# Patient Record
Sex: FEMALE | Race: WHITE | NOT HISPANIC OR LATINO | URBAN - METROPOLITAN AREA
[De-identification: names, ages, dates, MRNs, and addresses within clinical notes are randomized per-mention and may not be internally consistent; named-entity substitution may affect disease eponyms.]

---

## 2018-08-14 ENCOUNTER — INPATIENT (INPATIENT)
Facility: HOSPITAL | Age: 43
LOS: 1 days | Discharge: ANOTHER HOSPITAL | DRG: 101 | End: 2018-08-16
Attending: HOSPITALIST | Admitting: HOSPITALIST
Payer: MEDICARE

## 2018-08-14 VITALS
RESPIRATION RATE: 16 BRPM | HEART RATE: 73 BPM | WEIGHT: 119.93 LBS | SYSTOLIC BLOOD PRESSURE: 104 MMHG | DIASTOLIC BLOOD PRESSURE: 66 MMHG | OXYGEN SATURATION: 100 % | TEMPERATURE: 98 F | HEIGHT: 61 IN

## 2018-08-14 DIAGNOSIS — E78.5 HYPERLIPIDEMIA, UNSPECIFIED: ICD-10-CM

## 2018-08-14 DIAGNOSIS — F19.20 OTHER PSYCHOACTIVE SUBSTANCE DEPENDENCE, UNCOMPLICATED: ICD-10-CM

## 2018-08-14 DIAGNOSIS — F10.10 ALCOHOL ABUSE, UNCOMPLICATED: ICD-10-CM

## 2018-08-14 DIAGNOSIS — F13.20 SEDATIVE, HYPNOTIC OR ANXIOLYTIC DEPENDENCE, UNCOMPLICATED: ICD-10-CM

## 2018-08-14 DIAGNOSIS — F41.9 ANXIETY DISORDER, UNSPECIFIED: ICD-10-CM

## 2018-08-14 DIAGNOSIS — Z29.9 ENCOUNTER FOR PROPHYLACTIC MEASURES, UNSPECIFIED: ICD-10-CM

## 2018-08-14 DIAGNOSIS — R94.5 ABNORMAL RESULTS OF LIVER FUNCTION STUDIES: ICD-10-CM

## 2018-08-14 DIAGNOSIS — R56.9 UNSPECIFIED CONVULSIONS: ICD-10-CM

## 2018-08-14 DIAGNOSIS — F17.200 NICOTINE DEPENDENCE, UNSPECIFIED, UNCOMPLICATED: ICD-10-CM

## 2018-08-14 DIAGNOSIS — F12.10 CANNABIS ABUSE, UNCOMPLICATED: ICD-10-CM

## 2018-08-14 LAB
ALBUMIN SERPL ELPH-MCNC: 3.5 G/DL — SIGNIFICANT CHANGE UP (ref 3.3–5)
ALP SERPL-CCNC: 39 U/L — LOW (ref 40–120)
ALT FLD-CCNC: 125 U/L — HIGH (ref 12–78)
ANION GAP SERPL CALC-SCNC: 7 MMOL/L — SIGNIFICANT CHANGE UP (ref 5–17)
APTT BLD: 29.4 SEC — SIGNIFICANT CHANGE UP (ref 27.5–37.4)
AST SERPL-CCNC: 69 U/L — HIGH (ref 15–37)
BASOPHILS # BLD AUTO: 0.01 K/UL — SIGNIFICANT CHANGE UP (ref 0–0.2)
BASOPHILS NFR BLD AUTO: 0.1 % — SIGNIFICANT CHANGE UP (ref 0–2)
BILIRUB SERPL-MCNC: 0.1 MG/DL — LOW (ref 0.2–1.2)
BUN SERPL-MCNC: 10 MG/DL — SIGNIFICANT CHANGE UP (ref 7–23)
CALCIUM SERPL-MCNC: 8.4 MG/DL — LOW (ref 8.5–10.1)
CHLORIDE SERPL-SCNC: 105 MMOL/L — SIGNIFICANT CHANGE UP (ref 96–108)
CO2 SERPL-SCNC: 30 MMOL/L — SIGNIFICANT CHANGE UP (ref 22–31)
CREAT SERPL-MCNC: 0.86 MG/DL — SIGNIFICANT CHANGE UP (ref 0.5–1.3)
EOSINOPHIL # BLD AUTO: 0.2 K/UL — SIGNIFICANT CHANGE UP (ref 0–0.5)
EOSINOPHIL NFR BLD AUTO: 2.7 % — SIGNIFICANT CHANGE UP (ref 0–6)
GLUCOSE SERPL-MCNC: 92 MG/DL — SIGNIFICANT CHANGE UP (ref 70–99)
HCT VFR BLD CALC: 36.5 % — SIGNIFICANT CHANGE UP (ref 34.5–45)
HGB BLD-MCNC: 12.7 G/DL — SIGNIFICANT CHANGE UP (ref 11.5–15.5)
IMM GRANULOCYTES NFR BLD AUTO: 0.3 % — SIGNIFICANT CHANGE UP (ref 0–1.5)
INR BLD: 1.04 RATIO — SIGNIFICANT CHANGE UP (ref 0.88–1.16)
LYMPHOCYTES # BLD AUTO: 1.95 K/UL — SIGNIFICANT CHANGE UP (ref 1–3.3)
LYMPHOCYTES # BLD AUTO: 25.9 % — SIGNIFICANT CHANGE UP (ref 13–44)
MCHC RBC-ENTMCNC: 31.5 PG — SIGNIFICANT CHANGE UP (ref 27–34)
MCHC RBC-ENTMCNC: 34.8 GM/DL — SIGNIFICANT CHANGE UP (ref 32–36)
MCV RBC AUTO: 90.6 FL — SIGNIFICANT CHANGE UP (ref 80–100)
MONOCYTES # BLD AUTO: 0.58 K/UL — SIGNIFICANT CHANGE UP (ref 0–0.9)
MONOCYTES NFR BLD AUTO: 7.7 % — SIGNIFICANT CHANGE UP (ref 2–14)
NEUTROPHILS # BLD AUTO: 4.78 K/UL — SIGNIFICANT CHANGE UP (ref 1.8–7.4)
NEUTROPHILS NFR BLD AUTO: 63.3 % — SIGNIFICANT CHANGE UP (ref 43–77)
NRBC # BLD: 0 /100 WBCS — SIGNIFICANT CHANGE UP (ref 0–0)
PLATELET # BLD AUTO: 250 K/UL — SIGNIFICANT CHANGE UP (ref 150–400)
POTASSIUM SERPL-MCNC: 3.7 MMOL/L — SIGNIFICANT CHANGE UP (ref 3.5–5.3)
POTASSIUM SERPL-SCNC: 3.7 MMOL/L — SIGNIFICANT CHANGE UP (ref 3.5–5.3)
PROT SERPL-MCNC: 6.9 G/DL — SIGNIFICANT CHANGE UP (ref 6–8.3)
PROTHROM AB SERPL-ACNC: 11.3 SEC — SIGNIFICANT CHANGE UP (ref 9.8–12.7)
RBC # BLD: 4.03 M/UL — SIGNIFICANT CHANGE UP (ref 3.8–5.2)
RBC # FLD: 12.3 % — SIGNIFICANT CHANGE UP (ref 10.3–14.5)
SODIUM SERPL-SCNC: 142 MMOL/L — SIGNIFICANT CHANGE UP (ref 135–145)
WBC # BLD: 7.54 K/UL — SIGNIFICANT CHANGE UP (ref 3.8–10.5)
WBC # FLD AUTO: 7.54 K/UL — SIGNIFICANT CHANGE UP (ref 3.8–10.5)

## 2018-08-14 PROCEDURE — 93010 ELECTROCARDIOGRAM REPORT: CPT

## 2018-08-14 PROCEDURE — 70450 CT HEAD/BRAIN W/O DYE: CPT | Mod: 26

## 2018-08-14 PROCEDURE — 99223 1ST HOSP IP/OBS HIGH 75: CPT | Mod: GC,AI

## 2018-08-14 PROCEDURE — 99285 EMERGENCY DEPT VISIT HI MDM: CPT

## 2018-08-14 RX ORDER — CITALOPRAM 10 MG/1
40 TABLET, FILM COATED ORAL DAILY
Qty: 0 | Refills: 0 | Status: DISCONTINUED | OUTPATIENT
Start: 2018-08-14 | End: 2018-08-16

## 2018-08-14 RX ORDER — CITALOPRAM 10 MG/1
1 TABLET, FILM COATED ORAL
Qty: 0 | Refills: 0 | COMMUNITY

## 2018-08-14 RX ORDER — SODIUM CHLORIDE 9 MG/ML
1000 INJECTION INTRAMUSCULAR; INTRAVENOUS; SUBCUTANEOUS ONCE
Qty: 0 | Refills: 0 | Status: COMPLETED | OUTPATIENT
Start: 2018-08-14 | End: 2018-08-14

## 2018-08-14 RX ORDER — TRAZODONE HCL 50 MG
1 TABLET ORAL
Qty: 0 | Refills: 0 | COMMUNITY

## 2018-08-14 RX ORDER — MELOXICAM 15 MG/1
1 TABLET ORAL
Qty: 0 | Refills: 0 | COMMUNITY

## 2018-08-14 RX ORDER — LEVETIRACETAM 250 MG/1
1500 TABLET, FILM COATED ORAL EVERY 12 HOURS
Qty: 0 | Refills: 0 | Status: DISCONTINUED | OUTPATIENT
Start: 2018-08-14 | End: 2018-08-16

## 2018-08-14 RX ORDER — NICOTINE POLACRILEX 2 MG
2 GUM BUCCAL
Qty: 0 | Refills: 0 | COMMUNITY

## 2018-08-14 RX ORDER — HYDROXYZINE HCL 10 MG
25 TABLET ORAL EVERY 8 HOURS
Qty: 0 | Refills: 0 | Status: DISCONTINUED | OUTPATIENT
Start: 2018-08-14 | End: 2018-08-16

## 2018-08-14 RX ORDER — HYDROXYZINE HCL 10 MG
1 TABLET ORAL
Qty: 0 | Refills: 0 | COMMUNITY

## 2018-08-14 RX ORDER — FOLIC ACID 0.8 MG
1 TABLET ORAL DAILY
Qty: 0 | Refills: 0 | Status: DISCONTINUED | OUTPATIENT
Start: 2018-08-14 | End: 2018-08-16

## 2018-08-14 RX ORDER — CELECOXIB 200 MG/1
200 CAPSULE ORAL
Qty: 0 | Refills: 0 | Status: DISCONTINUED | OUTPATIENT
Start: 2018-08-14 | End: 2018-08-16

## 2018-08-14 RX ORDER — THIAMINE MONONITRATE (VIT B1) 100 MG
100 TABLET ORAL DAILY
Qty: 0 | Refills: 0 | Status: DISCONTINUED | OUTPATIENT
Start: 2018-08-14 | End: 2018-08-16

## 2018-08-14 RX ORDER — ENOXAPARIN SODIUM 100 MG/ML
40 INJECTION SUBCUTANEOUS EVERY 24 HOURS
Qty: 0 | Refills: 0 | Status: DISCONTINUED | OUTPATIENT
Start: 2018-08-14 | End: 2018-08-14

## 2018-08-14 RX ORDER — LACOSAMIDE 50 MG/1
200 TABLET ORAL
Qty: 0 | Refills: 0 | Status: DISCONTINUED | OUTPATIENT
Start: 2018-08-14 | End: 2018-08-16

## 2018-08-14 RX ORDER — NICOTINE POLACRILEX 2 MG
1 GUM BUCCAL
Qty: 0 | Refills: 0 | COMMUNITY

## 2018-08-14 RX ORDER — MAGNESIUM HYDROXIDE 400 MG/1
30 TABLET, CHEWABLE ORAL
Qty: 0 | Refills: 0 | COMMUNITY

## 2018-08-14 RX ORDER — LAMOTRIGINE 25 MG/1
100 TABLET, ORALLY DISINTEGRATING ORAL
Qty: 0 | Refills: 0 | Status: DISCONTINUED | OUTPATIENT
Start: 2018-08-14 | End: 2018-08-15

## 2018-08-14 RX ORDER — NICOTINE POLACRILEX 2 MG
2 GUM BUCCAL
Qty: 0 | Refills: 0 | Status: DISCONTINUED | OUTPATIENT
Start: 2018-08-14 | End: 2018-08-16

## 2018-08-14 RX ORDER — NICOTINE POLACRILEX 2 MG
1 GUM BUCCAL DAILY
Qty: 0 | Refills: 0 | Status: DISCONTINUED | OUTPATIENT
Start: 2018-08-14 | End: 2018-08-16

## 2018-08-14 RX ORDER — FOLIC ACID 0.8 MG
1 TABLET ORAL
Qty: 0 | Refills: 0 | COMMUNITY

## 2018-08-14 RX ADMIN — ENOXAPARIN SODIUM 40 MILLIGRAM(S): 100 INJECTION SUBCUTANEOUS at 18:47

## 2018-08-14 RX ADMIN — Medication 2 MILLIGRAM(S): at 15:23

## 2018-08-14 RX ADMIN — LEVETIRACETAM 400 MILLIGRAM(S): 250 TABLET, FILM COATED ORAL at 18:43

## 2018-08-14 RX ADMIN — SODIUM CHLORIDE 1000 MILLILITER(S): 9 INJECTION INTRAMUSCULAR; INTRAVENOUS; SUBCUTANEOUS at 18:00

## 2018-08-14 RX ADMIN — SODIUM CHLORIDE 1000 MILLILITER(S): 9 INJECTION INTRAMUSCULAR; INTRAVENOUS; SUBCUTANEOUS at 15:39

## 2018-08-14 NOTE — ED ADULT NURSE REASSESSMENT NOTE - NS ED NURSE REASSESS COMMENT FT1
1530 pt had seizure lasting approx 4min. ativan 2 mg given IV.  pt responded .     1700 pot had another seizure safety precautions taken  seizure lasted approx 3min. resolved on it own.  pt lethargic/alert to person/place.

## 2018-08-14 NOTE — H&P ADULT - NEUROLOGICAL DETAILS
alert and oriented x 3/responds to pain/responds to verbal commands/no spontaneous movement/sensation intact

## 2018-08-14 NOTE — ED PROVIDER NOTE - OBJECTIVE STATEMENT
pt with hx alcohol, benzo, opiate and tobacco abuse, epilepsy bib ems from [facility] for 2 sz today. per ems and [facility] aide, pt had witnessed sz, fell, hit head, was given versed 10mg im, was awake after sz, en route to er, had another sz given versed 5mg iv by ems, awake upon arrival in er. pt c/o mild ha, no fever, d/n/v, cp, sob, abd pain, focal weakness or numbness.

## 2018-08-14 NOTE — CONSULT NOTE ADULT - SUBJECTIVE AND OBJECTIVE BOX
Patient is a 43y old  Female who presents with a chief complaint of Seizure (14 Aug 2018 17:23)    44 yo PMH epilepsy on Vimpat, Lamictal Keppra, admitted to [facility] for ETOH and opiate withdrawal, pt found to have 3 seizures today while in [facility], EMS was called pt seized again on route to ED with EMS, recieved 10 mg Versed IM and 5 mg Versed IV vis EMS.  Pt seized 4 X again in ED and received 2 mg ativan in ED.  ICU c/s was called with concern for continuous seizures with possible need for airway protection.  Pt was seen by neurology, VImpat, Lamictal and Keppra doses increased. Keppra levels pending.  Pt was examined and assessed at the bedside in ED. Pt states she normally has 2 seizures a week at baseline, and admits to increased fluid intake, thirst and urinary frequency.  Pt denies recent fevers at home.  Pt noted to be awake, alert and oriented X3, VSS, and protecting her airway at this time.  Pt seizures likely chronic seeing that she has 2 weekly, and meds are likely subtherapeutic.  No noted signs of infectious etiology at this time, labs unremarkable. Pt last drink was 7/25, likely not in withdrawals at this time.  In the setting of increased fluid intake and increased thirst with urinary frequency, would consider monitoring sodium levels and workup for DM.  Pt is HD stable at this time, protecting airway and does not need ICU care at this time.        24 hour events: ***  PAST MEDICAL & SURGICAL HISTORY:  Anxiety  Seizure  Marijuana abuse  Benzodiazepine dependence  ETOH abuse  No significant past surgical history      Review of Systems:  Constitutional: No fever, + chills  Neuro: No headache, numbness, weakness, + tremors  Resp: No cough, wheezing, shortness of breath  CVS: No chest pain, palpitations, leg swelling  GI: No abdominal pain, nausea, vomiting, diarrhea   : No dysuria, +frequency,     T(F): 98.2 (08-14-18 @ 15:15), Max: 98.5 (08-14-18 @ 14:58)  HR: 52 (08-14-18 @ 18:52) (48 - 73)  BP: 125/74 (08-14-18 @ 18:52) (104/66 - 132/70)  RR: 14 (08-14-18 @ 18:52) (12 - 16)  SpO2: 100% (08-14-18 @ 18:52) (100% - 100%)  Wt(kg): --        CAPILLARY BLOOD GLUCOSE    I&O's Summary      Physical Exam:     Gen:   Neuro: awake, A&Ox3, non-focal, NAD, well developed  HEENT: NC/AT  Resp: CTA b/l, protecting airway, SpO2 99% on RA   CVS: nl S1/S2, RRR  Abd: soft, nt, nd, +bs  Ext: no edema, +pulses  Skin: well perfused, warm    Meds:    cloNIDine Oral PRN  celecoxib Oral  citalopram Oral  hydrOXYzine hydrochloride Oral PRN  lacosamide Oral  lamoTRIgine Oral  levETIRAcetam  IVPB IV Intermittent  LORazepam   Injectable IV Push PRN  enoxaparin Injectable SubCutaneous  folic acid Oral  multivitamin Oral  thiamine Oral  nicotine  Polacrilex Gum Oral PRN  nicotine -   7 mG/24Hr(s) Patch Transdermal                            12.7   7.54  )-----------( 250      ( 14 Aug 2018 15:49 )             36.5       08-14    142  |  105  |  10  ----------------------------<  92  3.7   |  30  |  0.86    Ca    8.4<L>      14 Aug 2018 15:49    TPro  6.9  /  Alb  3.5  /  TBili  0.1<L>  /  DBili  x   /  AST  69<H>  /  ALT  125<H>  /  AlkPhos  39<L>  08-14          PT/INR - ( 14 Aug 2018 15:49 )   PT: 11.3 sec;   INR: 1.04 ratio         PTT - ( 14 Aug 2018 15:49 )  PTT:29.4 sec    Radiology: *** < from: CT Head No Cont (08.14.18 @ 16:22) >  IMPRESSION: No acute intracranial hemorrhage, mass effect, or shift of   the midline structures.        < end of copied text >      CENTRAL LINE: N          DATE INSERTED:              REMOVE: Y/N    NINA: N                        DATE INSERTED:              REMOVE: Y/N    A-LINE: N                       DATE INSERTED:              REMOVE: Y/N    GLOBAL ISSUE/BEST PRACTICE:  Analgesia: none   Sedation: none   HOB elevation: yes  Stress ulcer prophylaxis:  VTE prophylaxis: Lovenox/PT   Glycemic control: none at this time   Nutrition: NPO       CODE STATUS: Full Code   GOC discussion: Y   Critical care time spent (mins): 35 min  (Reviewing data, imaging, discussing with multidisciplinary team, non inclusive of procedures, discussing goals of care with patient/family)

## 2018-08-14 NOTE — H&P ADULT - NSHPPHYSICALEXAM_GEN_ALL_CORE
Vital Signs Last 24 Hrs  T(C): 36.8 (14 Aug 2018 15:15), Max: 36.9 (14 Aug 2018 14:58)  T(F): 98.2 (14 Aug 2018 15:15), Max: 98.5 (14 Aug 2018 14:58)  HR: 65 (14 Aug 2018 15:37) (65 - 73)  BP: 117/62 (14 Aug 2018 15:37) (104/66 - 132/70)  RR: 15 (14 Aug 2018 15:37) (15 - 16)  SpO2: 100% (14 Aug 2018 15:37) (100% - 100%)

## 2018-08-14 NOTE — H&P ADULT - PROBLEM SELECTOR PLAN 5
- continue celexa and vistaril Acutely mildly elevated LFTs. Labs on 8/3/18 show normal LFTs  - unlikely due to FENTON or alcohol due to acute elevation   - No drug use since admitted to [facility] earlier this month  - Will recheck in AM

## 2018-08-14 NOTE — ED ADULT NURSE NOTE - INTERVENTIONS DEFINITIONS
[place] to call system/Instruct patient to call for assistance/Physically safe environment: no spills, clutter or unnecessary equipment/Call bell, personal items and telephone within reach/Room bathroom lighting operational/Non-slip footwear when patient is off stretcher

## 2018-08-14 NOTE — H&P ADULT - PROBLEM SELECTOR PLAN 4
Admitted to [facility] for hx of alcohol, benzo, and opioid abuse  - Catapres prn for breakthrough withdrawal  - Continue multivitamins, vit b1 and folic acid Admitted to [facility] for hx of alcohol, benzo, and opioid abuse  - Catapres prn for breakthrough withdrawal  - Continue multivitamins, thiamine and folic acid Admitted to [facility] for hx of alcohol, benzo, and opioid abuse  - Catapres prn for breakthrough withdrawal  - Continue multivitamins, thiamine and folic acid  -Detox consult Dr Umana - continue celexa and vistaril

## 2018-08-14 NOTE — ED ADULT NURSE NOTE - CHIEF COMPLAINT QUOTE
pt sent from [facility] for seizure lasting 5 minutes, medicated with Versed 10 MG IM then transferred via ems, seizure in route to hospital Versed 5 MG IV given by ems

## 2018-08-14 NOTE — H&P ADULT - HISTORY OF PRESENT ILLNESS
43F with PMH of seizures on keppra presents to ED with seizure episode.     In ED, vitals stable. Labs significant for mildly elevated LFTs. CT head negative. Keppra and Lamotrigine levels pending. Evaluated by neuro in ED. Keppra increased to 1500mg BID. No further seizures noted. 43F with PMH of epilepsy and anxiety presents to ED from [facility] with multiple seizures today. Patient admitted to [facility] earlier this month (8/3/18) for alcohol, benzo and opioid detox. Last know seizure was in July 24, 2018 and hx of weekly blackouts. She has not abused any substances while in detox. Admits to 3 seizures this morning, 1 in ambulance, and 4 while in ED. She hit her head when she had her first seizure this morning. Denies fever, CP, SOB, dizziness, numbness/tingling.     In ED, vitals stable. Labs significant for mildly elevated LFTs. EKG shows sinus jeff at 54. CT head negative. Keppra and Lamotrigine levels pending. Evaluated by neuro in ED. Keppra increased to 1500mg BID. Patient continues to have small Seizure-like activity in ED that lasts less than 30 sec with no post ictal period. 43F with PMH of epilepsy and anxiety presents to ED from [facility] with multiple seizures today. Patient admitted herself to [facility] earlier this month (8/3/18) for alcohol, benzo and opioid detox. Last know seizure was in July 24, 2018 and hx of weekly blackouts. She lives in [place], [place] but was here visiting her brother for a graduation. Of note she was discharged from detox 7/31 and relapsed the day of her discharge. She has not abused any substances while admitted to [facility]. Admits to 3 seizures this morning, 1 in ambulance, and 4 while in ED. She hit her head when she had her first seizure this morning. Denies fever, CP, SOB, dizziness, numbness/tingling.     In ED, vitals stable. Labs significant for mildly elevated LFTs. EKG shows sinus jeff at 54. CT head negative. Keppra and Lamotrigine levels pending. Evaluated by neuro in ED. Keppra increased to 1500mg BID. Given 1L NS bolus. Patient continues to have small Seizure-like activity in ED that lasts less than 30 sec with no post ictal period.

## 2018-08-14 NOTE — H&P ADULT - PROBLEM SELECTOR PLAN 2
Mildly elevated FLTs.   - Check Lipids Acutely mildly elevated LFTs. Labs on 8/3/18 show normal LFTs  - unlikely due to FENTON or alcohol use   - No drug use since admitted to [facility] earlier this month  - Will recheck in AM Acutely mildly elevated LFTs. Labs on 8/3/18 show normal LFTs  - unlikely due to FENTON or alcohol due to acute elevation   - No drug use since admitted to [facility] earlier this month  - Will recheck in AM Admitted to [facility] for hx of alcohol, benzo, and opioid abuse  -no CIWA reported on ED admission  -last reported alcohol use 8/3/18  - Catapres prn for breakthrough withdrawal  - Continue multivitamins, thiamine and folic acid  -Detox consult Dr Umana

## 2018-08-14 NOTE — ED ADULT TRIAGE NOTE - CHIEF COMPLAINT QUOTE
pt sent from [facility] for seizure lasting 5 minutes, medicated with Versed 10 MG IM then transferred via ems, seizure in route to hospital Versed 5 MG IV given by ems

## 2018-08-14 NOTE — H&P ADULT - PROBLEM SELECTOR PLAN 1
Admit to Tele.   - Increased dose of Keppra to 1500mg BID  - Keppra and Lamictal levels pending   - Ativan 2mg IVP q6h prn for breakthrough seizures   - EEG ordered for AM   - Seizure precautions   - Neuro consulted (Dr. Weir) Admit to TELE. Continues to have multiple seizures.   - Increased dose of Keppra to 1500mg BID  - continue vimpat  - Keppra and Lamictal levels pending   - Ativan 2mg IVP q6h prn for breakthrough seizures   - EEG ordered for AM   - Seizure precautions   - Neuro consulted (Dr. Weir) Admit to TELE. Continues to have multiple breakthrough seizures.   - Increased dose of Keppra to 1500mg BID  - continue vimpat  - Keppra and Lamictal levels pending   - Ativan 2mg IVP q6h prn for breakthrough seizures   - EEG ordered for AM   - Seizure precautions   - Neuro consulted (Dr. Weir)  -Critical care consult Admit to TELE. Continues to have multiple breakthrough seizures.   - Increased dose of Keppra to 1500mg BID  - continue vimpat  - Keppra and Lamictal levels pending   - Ativan 2mg IVP q6h prn for breakthrough seizures   - EEG ordered for AM   - Seizure precautions   -Check UA, as infection may lower threshold  - Neuro consulted (Dr. Weir)  -Critical care consult

## 2018-08-14 NOTE — H&P ADULT - PMH
Benzodiazepine dependence    ETOH abuse    Marijuana abuse    Seizure Anxiety    Benzodiazepine dependence    ETOH abuse    Marijuana abuse    Seizure

## 2018-08-14 NOTE — H&P ADULT - PROBLEM SELECTOR PLAN 7
IMPROVE VTE Individual Risk Assessment          RISK                                                          Points  [  ] Previous VTE                                                3  [  ] Thrombophilia                                            2  [  ] Lower limb paralysis                                  2        (unable to hold up >15 seconds)    [  ] Current Cancer                                            2         (within 6 months)  [  ] Immobilization > 24 hrs                             1  [  ] ICU/CCU stay > 24 hours                           1  [  ] Age > 60                                                        1    IMPROVE VTE Score: 0     DVT ppx: lovenox IMPROVE VTE Individual Risk Assessment          RISK                                                          Points  [  ] Previous VTE                                                3  [  ] Thrombophilia                                            2  [  ] Lower limb paralysis                                  2        (unable to hold up >15 seconds)    [  ] Current Cancer                                            2         (within 6 months)  [  ] Immobilization > 24 hrs                             1  [  ] ICU/CCU stay > 24 hours                           1  [  ] Age > 60                                                        1    IMPROVE VTE Score: 0     DVT ppx: SCDs [place] and IMPROV scores of 0, 0 .     SCDs

## 2018-08-14 NOTE — H&P ADULT - RS GEN PE MLT RESP DETAILS PC
respirations non-labored/good air movement/breath sounds equal/clear to auscultation bilaterally/normal/airway patent

## 2018-08-14 NOTE — CONSULT NOTE ADULT - ASSESSMENT
episode status-- seizure.  I would recommend EEG to evaluate for epileptiform discharges.  Ativan 2 mg IV push q. six h p.r.n. for any type of breakthrough seizures.  I would recommend seizure precautions.  increase keppra 1500 bid continue rest of AED  detox.

## 2018-08-14 NOTE — H&P ADULT - ASSESSMENT
43F with PMH of epilepsy and anxiety presents to ED from [facility] admitted for multiple seizures.

## 2018-08-14 NOTE — H&P ADULT - NSHPREVIEWOFSYSTEMS_GEN_ALL_CORE
CONSTITUTIONAL:  No fever, chills,  weight loss, weakness or fatigue.  HEENT:  Eyes:  No visual loss, blurred vision, diplopia or yellow sclerae.   Ears, Nose, Throat:  No hearing loss, congestion, runny nose or dysphagia.  CARDIOVASCULAR:  No chest pain, chest pressure or chest discomfort. No palpitations or edema.  RESPIRATORY:  No dyspnea, cough, congestion, wheeze.   GASTROINTESTINAL:  No abdominal pain, nausea, vomiting or diarrhea.   GENITOURINARY:  No dysuria, urinary frequency or hematuria.   NEUROLOGICAL:  No headache, dizziness, syncope, paralysis, ataxia, numbness or tingling in the extremities. No change in bowel or bladder control.  MUSCULOSKELETAL:  No muscle, back pain, joint pain or stiffness.  SKIN: No itch, rash, lesions.   HEMATOLOGIC:  No anemia, bleeding or bruising.  LYMPHATICS:  No enlarged nodes. No history of splenectomy.  PSYCHIATRIC:  No history of depression or anxiety.  ENDOCRINOLOGIC:  No reports of sweating, cold or heat intolerance. No polyuria or polydipsia. CONSTITUTIONAL:  No fever, chills,  weight loss, weakness or fatigue. Seizures   HEENT:  Eyes:  No visual loss, blurred vision, diplopia or yellow sclerae.   Ears, Nose, Throat:  No hearing loss, congestion, runny nose or dysphagia.  CARDIOVASCULAR:  No chest pain, chest pressure or chest discomfort. No palpitations or edema.  RESPIRATORY:  No dyspnea, cough, congestion, wheeze.   GASTROINTESTINAL:  No abdominal pain, nausea, vomiting or diarrhea.   GENITOURINARY:  No dysuria, urinary frequency or hematuria.   NEUROLOGICAL:  No headache, dizziness, syncope, paralysis, ataxia, numbness or tingling in the extremities.   MUSCULOSKELETAL:  No muscle, back pain, joint pain or stiffness.  SKIN: No itch, rash, lesions.

## 2018-08-14 NOTE — ED ADULT NURSE NOTE - OBJECTIVE STATEMENT
pt arrived at ED A&O x 3 cl speech  lethargic.  pt care giver from [facility] reports pt sent to ED s/p witnessed seizure. Caregiver reports 3 witnessed seizures prior to arrival to ED. Presently without seizure activity

## 2018-08-14 NOTE — H&P ADULT - MUSCULOSKELETAL
detailed exam ROM intact/normal/no joint swelling/no calf tenderness/no joint warmth/no joint erythema

## 2018-08-14 NOTE — CONSULT NOTE ADULT - ASSESSMENT
44 yo F PMH epilepsy on Vimpat, Lamictal Keppra, admitted to [facility] for ETOH and opiate withdrawal, ICU c/s was called with concern for continuous seizures with possible need for airway protection. Pt noted to be awake, alert and oriented X3, and protecting her airway at this time.  Pt seizures likely chronic seeing that she has 2 weekly, and meds are likely subtherapeutic.  No noted signs of infectious etiology at this time, labs unremarkable. Pt last drink was 7/25, likely not in withdrawals at this time.  In the setting of increased fluid intake and increased thirst with urinary frequency, would consider monitoring sodium levels and workup for DM.   Concern for further seizure activity with airway compromise, protecting airway at this time, and does not need ICU care at this time.      Plan    1.  Seizures/Epilepsy   -Pt can be admitted to F at this time,protecting airway, awake and following commands, alert and oriented X3   -Seizures likely 2/2 to subtherapeutic seizures meds  -Neuro checks, Ativan PRN seizure activity   -Pt seen by neuro, Vimpat/Keppra/Lamictal dose increased. levels pending   -Pt admits to last drink 7/25, likely not withdrawal seizure, consider sending Utox   -Follow sodium levels in the setting of increased fluid intake  -Consider DM workup, HbA1C, in the setting of increased thirst and urinary frequency  -Will follow as needed 44 yo F PMH epilepsy on Vimpat, Lamictal Keppra, admitted to [facility] for ETOH and opiate withdrawal, ICU c/s was called with concern for continuous seizures with possible need for airway protection. Pt noted to be awake, alert and oriented X3, and protecting her airway at this time.  Pt seizures likely chronic seeing that she has 2 weekly, and meds are likely subtherapeutic.  No noted signs of infectious etiology at this time, labs unremarkable. Pt last drink was 7/25, likely not in withdrawals at this time.  In the setting of increased fluid intake and increased thirst with urinary frequency, would consider monitoring sodium levels and workup for DM.   Concern for further seizure activity with airway compromise, protecting airway at this time, and does not need ICU care at this time.      Plan    1.  Seizures/Epilepsy   -Pt can be admitted to F at this time,protecting airway, awake and following commands, alert and oriented X3   -Seizures likely 2/2 to subtherapeutic seizures meds  -Neuro checks, Ativan PRN seizure activity   -Pt seen by neuro, Vimpat/Keppra/Lamictal dose increased. levels pending   -Pt admits to last drink 7/25, likely not withdrawal seizure, consider sending Utox   -Follow sodium levels in the setting of increased fluid intake  -Consider DM workup, HbA1C, in the setting of increased thirst and urinary frequency  -Will follow as needed, with concern for future seizures, with potential need for intubation for airway protection

## 2018-08-15 LAB
ALBUMIN SERPL ELPH-MCNC: 3.2 G/DL — LOW (ref 3.3–5)
ALP SERPL-CCNC: 41 U/L — SIGNIFICANT CHANGE UP (ref 40–120)
ALT FLD-CCNC: 124 U/L — HIGH (ref 12–78)
ANION GAP SERPL CALC-SCNC: 7 MMOL/L — SIGNIFICANT CHANGE UP (ref 5–17)
AST SERPL-CCNC: 63 U/L — HIGH (ref 15–37)
BASE EXCESS BLDA CALC-SCNC: 0.9 MMOL/L — SIGNIFICANT CHANGE UP (ref -2–2)
BILIRUB SERPL-MCNC: 0.2 MG/DL — SIGNIFICANT CHANGE UP (ref 0.2–1.2)
BLOOD GAS COMMENTS ARTERIAL: SIGNIFICANT CHANGE UP
BUN SERPL-MCNC: 11 MG/DL — SIGNIFICANT CHANGE UP (ref 7–23)
CALCIUM SERPL-MCNC: 8.4 MG/DL — LOW (ref 8.5–10.1)
CHLORIDE SERPL-SCNC: 106 MMOL/L — SIGNIFICANT CHANGE UP (ref 96–108)
CO2 SERPL-SCNC: 27 MMOL/L — SIGNIFICANT CHANGE UP (ref 22–31)
CREAT SERPL-MCNC: 0.88 MG/DL — SIGNIFICANT CHANGE UP (ref 0.5–1.3)
GLUCOSE SERPL-MCNC: 99 MG/DL — SIGNIFICANT CHANGE UP (ref 70–99)
HCO3 BLDA-SCNC: 26 MMOL/L — SIGNIFICANT CHANGE UP (ref 23–27)
HOROWITZ INDEX BLDA+IHG-RTO: SIGNIFICANT CHANGE UP
PCO2 BLDA: 36 MMHG — SIGNIFICANT CHANGE UP (ref 32–46)
PH BLDA: 7.44 — SIGNIFICANT CHANGE UP (ref 7.35–7.45)
PO2 BLDA: 118 MMHG — HIGH (ref 74–108)
POTASSIUM SERPL-MCNC: 4 MMOL/L — SIGNIFICANT CHANGE UP (ref 3.5–5.3)
POTASSIUM SERPL-SCNC: 4 MMOL/L — SIGNIFICANT CHANGE UP (ref 3.5–5.3)
PROT SERPL-MCNC: 6.3 G/DL — SIGNIFICANT CHANGE UP (ref 6–8.3)
SAO2 % BLDA: 99 % — HIGH (ref 92–96)
SODIUM SERPL-SCNC: 140 MMOL/L — SIGNIFICANT CHANGE UP (ref 135–145)

## 2018-08-15 PROCEDURE — 99232 SBSQ HOSP IP/OBS MODERATE 35: CPT | Mod: GC

## 2018-08-15 RX ORDER — LEVETIRACETAM 250 MG/1
1000 TABLET, FILM COATED ORAL ONCE
Qty: 0 | Refills: 0 | Status: DISCONTINUED | OUTPATIENT
Start: 2018-08-15 | End: 2018-08-15

## 2018-08-15 RX ORDER — LAMOTRIGINE 25 MG/1
125 TABLET, ORALLY DISINTEGRATING ORAL
Qty: 0 | Refills: 0 | Status: DISCONTINUED | OUTPATIENT
Start: 2018-08-15 | End: 2018-08-16

## 2018-08-15 RX ORDER — ALPRAZOLAM 0.25 MG
0.5 TABLET ORAL
Qty: 0 | Refills: 0 | Status: DISCONTINUED | OUTPATIENT
Start: 2018-08-15 | End: 2018-08-15

## 2018-08-15 RX ORDER — SODIUM CHLORIDE 9 MG/ML
500 INJECTION INTRAMUSCULAR; INTRAVENOUS; SUBCUTANEOUS ONCE
Qty: 0 | Refills: 0 | Status: COMPLETED | OUTPATIENT
Start: 2018-08-15 | End: 2018-08-15

## 2018-08-15 RX ADMIN — Medication 2 MILLIGRAM(S): at 19:35

## 2018-08-15 RX ADMIN — LACOSAMIDE 200 MILLIGRAM(S): 50 TABLET ORAL at 17:33

## 2018-08-15 RX ADMIN — CELECOXIB 200 MILLIGRAM(S): 200 CAPSULE ORAL at 17:32

## 2018-08-15 RX ADMIN — LAMOTRIGINE 125 MILLIGRAM(S): 25 TABLET, ORALLY DISINTEGRATING ORAL at 17:33

## 2018-08-15 RX ADMIN — CELECOXIB 200 MILLIGRAM(S): 200 CAPSULE ORAL at 08:41

## 2018-08-15 RX ADMIN — LAMOTRIGINE 100 MILLIGRAM(S): 25 TABLET, ORALLY DISINTEGRATING ORAL at 06:04

## 2018-08-15 RX ADMIN — Medication 2 MILLIGRAM(S): at 11:45

## 2018-08-15 RX ADMIN — CITALOPRAM 40 MILLIGRAM(S): 10 TABLET, FILM COATED ORAL at 12:01

## 2018-08-15 RX ADMIN — LEVETIRACETAM 400 MILLIGRAM(S): 250 TABLET, FILM COATED ORAL at 17:33

## 2018-08-15 RX ADMIN — Medication 1 PATCH: at 12:01

## 2018-08-15 RX ADMIN — LEVETIRACETAM 400 MILLIGRAM(S): 250 TABLET, FILM COATED ORAL at 06:04

## 2018-08-15 RX ADMIN — LACOSAMIDE 200 MILLIGRAM(S): 50 TABLET ORAL at 06:04

## 2018-08-15 RX ADMIN — Medication 2 MILLIGRAM(S): at 11:30

## 2018-08-15 RX ADMIN — CELECOXIB 200 MILLIGRAM(S): 200 CAPSULE ORAL at 18:15

## 2018-08-15 RX ADMIN — SODIUM CHLORIDE 500 MILLILITER(S): 9 INJECTION INTRAMUSCULAR; INTRAVENOUS; SUBCUTANEOUS at 15:48

## 2018-08-15 RX ADMIN — Medication 1 MILLIGRAM(S): at 12:01

## 2018-08-15 RX ADMIN — Medication 1 TABLET(S): at 12:01

## 2018-08-15 RX ADMIN — Medication 2 MILLIGRAM(S): at 11:35

## 2018-08-15 RX ADMIN — Medication 220 MILLIGRAM(S): at 11:45

## 2018-08-15 RX ADMIN — Medication 100 MILLIGRAM(S): at 12:01

## 2018-08-15 RX ADMIN — CELECOXIB 200 MILLIGRAM(S): 200 CAPSULE ORAL at 09:00

## 2018-08-15 NOTE — DISCHARGE NOTE ADULT - PATIENT PORTAL LINK FT
You can access the [facility] Patient Portal, offered by [facility], by registering with the following website: [email or web address]

## 2018-08-15 NOTE — PROGRESS NOTE ADULT - ASSESSMENT
This is a 43-year-old with history of substance abuse and now multiple seizures status epilepticus. Pt is a 43-year-old with history of substance abuse and multiple seizures with status epilepticus. Pt was given Pt is a 43-year-old with history of substance abuse and multiple seizures with status epilepticus.

## 2018-08-15 NOTE — DISCHARGE NOTE ADULT - CARE PLAN
Principal Discharge DX:	Seizure  Secondary Diagnosis:	Anxiety  Secondary Diagnosis:	Benzodiazepine dependence  Secondary Diagnosis:	Elevated LFTs  Secondary Diagnosis:	ETOH abuse  Secondary Diagnosis:	Nicotine dependence  Secondary Diagnosis:	Hyperlipidemia Principal Discharge DX:	Seizure  Goal:	stable  Assessment and plan of treatment:	You came in for multiple seizures. You were treated with Ativan as needed, and we increased your dose of Keppra. We continued you on your Vimpat and Lamictal. Please follow up with your neurologist as an outpatient and continue taking your medications as prescribed.  Secondary Diagnosis:	Anxiety  Assessment and plan of treatment:	Continue with Atarax.  Secondary Diagnosis:	Benzodiazepine dependence  Assessment and plan of treatment:	Continue with Catapres.  Secondary Diagnosis:	Elevated LFTs  Assessment and plan of treatment:	You had elevated liver enzymes. We continued to monitor it during your stay, until it started trending down. Please follow up with your PMD as an outpatient to recheck your levels.  Secondary Diagnosis:	ETOH abuse  Assessment and plan of treatment:	Continue multivitamins, thiamine and folic acid.  Secondary Diagnosis:	Nicotine dependence  Assessment and plan of treatment:	Continue with your nicotine patch and gum.  Secondary Diagnosis:	Hyperlipidemia  Assessment and plan of treatment:	Avoid high cholesterol diet. Principal Discharge DX:	Seizure  Goal:	To have continuous video EEG monitoring  Assessment and plan of treatment:	You came in for multiple seizures. You were treated with Ativan as needed, and we increased your dose of Keppra and Lamictal. We continued you on your Vimpat.  You will be transferred to [facility] to be evaluated by Dr. Clark and to have continuous video EEG monitoring.  Activity per [facility] staff.  Regular diet.  Secondary Diagnosis:	Anxiety  Assessment and plan of treatment:	Continue with Atarax.  Secondary Diagnosis:	Benzodiazepine dependence  Assessment and plan of treatment:	Continue with Catapres.  Secondary Diagnosis:	Elevated LFTs  Assessment and plan of treatment:	You had elevated liver enzymes. We continued to monitor it during your stay, until it started trending down. Please follow up with your PMD as an outpatient to recheck your levels.  Secondary Diagnosis:	ETOH abuse  Assessment and plan of treatment:	Continue multivitamins, thiamine and folic acid.  Secondary Diagnosis:	Nicotine dependence  Assessment and plan of treatment:	Continue with your nicotine patch and gum.  Secondary Diagnosis:	Hyperlipidemia  Assessment and plan of treatment:	Avoid high cholesterol diet. Principal Discharge DX:	Seizure  Goal:	To have continuous video EEG monitoring  Assessment and plan of treatment:	You came in for multiple seizures. You were treated with Ativan as needed, and we increased your dose of Keppra and Lamictal. We continued you on your Vimpat.  You will be transferred to [facility] to be evaluated by Dr. Clark and to have continuous video EEG monitoring.  Activity per [facility] staff.  Low cholesterol diet.  Secondary Diagnosis:	Anxiety  Assessment and plan of treatment:	Continue with Atarax.  Secondary Diagnosis:	Benzodiazepine dependence  Assessment and plan of treatment:	Continue with Catapres.  Secondary Diagnosis:	Elevated LFTs  Assessment and plan of treatment:	You had elevated liver enzymes. We continued to monitor it during your stay, until it started trending down. Please follow up with your PMD as an outpatient to recheck your levels.  Secondary Diagnosis:	ETOH abuse  Assessment and plan of treatment:	Continue multivitamins, thiamine and folic acid.  Secondary Diagnosis:	Nicotine dependence  Assessment and plan of treatment:	Continue with your nicotine patch and gum.  Secondary Diagnosis:	Hyperlipidemia  Assessment and plan of treatment:	Avoid high cholesterol diet.

## 2018-08-15 NOTE — DISCHARGE NOTE ADULT - HOSPITAL COURSE
43F with PMH of epilepsy and anxiety presents to ED from [facility] with multiple seizures today. Patient admitted herself to [facility] earlier this month (8/3/18) for alcohol, benzo and opioid detox. Last know seizure was in July 24, 2018 and hx of weekly blackouts. She lives in [place], [place] but was here visiting her brother for a graduation. Of note she was discharged from detox 7/31 and relapsed the day of her discharge. She has not abused any substances while admitted to [facility]. Admits to 3 seizures this morning, 1 in ambulance, and 4 while in ED. She hit her head when she had her first seizure this morning. Denies fever, CP, SOB, dizziness, numbness/tingling. In ED, vitals stable. Labs significant for mildly elevated LFTs. EKG shows sinus jeff at 54. CT head negative. Keppra and Lamotrigine levels pending. Evaluated by neuro in ED. Keppra increased to 1500mg BID. Given 1L NS bolus. Patient continues to have small Seizure-like activity in ED that lasts less than 30 sec with no post ictal period. Pt admitted, and seen to have had another seizure treated with Ativan PRN. Pt's next seizure likely pseudoseizure, as pt was responsive to verbal and painful stimuli, and was not given any Ativan. 43F with PMH of epilepsy and anxiety presents to ED from [facility] with multiple seizures today. Patient admitted herself to [facility] earlier this month (8/3/18) for alcohol, benzo and opioid detox. Last know seizure was in July 24, 2018 and hx of weekly blackouts. She lives in [place], [place] but was here visiting her brother for a graduation. Of note she was discharged from detox 7/31 and relapsed the day of her discharge. She has not abused any substances while admitted to [facility]. Admits to 3 seizures this morning, 1 in ambulance, and 4 while in ED. She hit her head when she had her first seizure this morning. Denies fever, CP, SOB, dizziness, numbness/tingling. In ED, vitals stable. Labs significant for mildly elevated LFTs. EKG shows sinus jeff at 54. CT head negative. Keppra and Lamotrigine levels pending. Evaluated by neuro in ED. Keppra increased to 1500mg BID. Given 1L NS bolus. Patient continues to have small Seizure-like activity in ED that lasts less than 30 sec with no post ictal period. Pt admitted, and seen to have had another seizure treated with Ativan PRN. Pt's next seizure likely pseudoseizure, as pt was responsive to verbal and painful stimuli, and was not given any Ativan. Pt's brother reported that pt's magnet for an implanted antiepileptic device could not be obtained. Pt and family were instructed to obtain it outpatient, as initial implant was placed in [place]. Overnight, rapid responses were called 3 times. During the initial rapid response, pt was given 2mg Ativan and seizure resolved. During the subsequent seizures, pt resolved with simple saline flushes. During the following day, 2 rapid responses were called. Pt was again given 2mg Ativan IVP, and seizure resolved. During the subsequent seizure, pt was given 2 saline flushes during which her seizure resolved. Pt was transferred to [facility] for video EEG monitoring as recommended by neurology.     Patient improved clinically throughout hospital course. Patient seen and examined on day of discharge.    Vital Signs Last 24 Hrs  T(C): 36.8 (16 Aug 2018 07:30), Max: 37.6 (15 Aug 2018 23:24)  T(F): 98.3 (16 Aug 2018 07:30), Max: 99.6 (15 Aug 2018 23:24)  HR: 66 (16 Aug 2018 07:30) (66 - 85)  BP: 123/81 (16 Aug 2018 07:30) (107/74 - 125/89)  BP(mean): --  RR: 18 (16 Aug 2018 07:30) (18 - 18)  SpO2: 100% (16 Aug 2018 07:30) (98% - 100%)    Physical Exam:  General: Well developed, well nourished, NAD  HEENT: NCAT, PERRLA, EOMI bl, moist mucous membranes   Neck: Supple, nontender, no mass  Neurology: A&Ox3, nonfocal, CN II-XII grossly intact, sensation intact  Respiratory: CTA B/L, No W/R/R  CV: RRR, +S1/S2, no murmurs, rubs or gallops  Abdominal: Soft, NT, ND +BSx4  Extremities: No C/C/E, + peripheral pulses  MSK: Normal ROM, no joint erythema or warmth, no joint swelling   Skin: warm, dry, normal color, no rash or abnormal lesions    Patient is medically stable for transfer to [facility] with outpatient follow up. 43F with PMH of epilepsy and anxiety presents to ED from [facility] with multiple seizures today. Patient admitted herself to [facility] earlier this month (8/3/18) for alcohol, benzo and opioid detox. Last known seizure was in July 24, 2018 and hx of weekly blackouts. She lives in [place], [place] but was here visiting her brother for a graduation. Of note she was discharged from detox 7/31 and relapsed the day of her discharge. She has not abused any substances while admitted to [facility]. Admits to 3 seizures this morning, 1 in ambulance, and 4 while in ED. She hit her head when she had her first seizure this morning. Denies fever, CP, SOB, dizziness, numbness/tingling. In ED, vitals stable. Labs significant for mildly elevated LFTs. EKG shows sinus jeff at 54. CT head negative. Keppra and Lamotrigine levels pending. Evaluated by neuro in ED. Keppra increased to 1500mg BID. Given 1L NS bolus. Patient continues to have small Seizure-like activity in ED that lasts less than 30 sec with no post ictal period. Pt admitted, and seen to have had another seizure treated with Ativan PRN. Pt's next seizure likely pseudoseizure, as pt was responsive to verbal and painful stimuli, and was not given any Ativan. Pt's brother reported that pt's magnet for an implanted antiepileptic device could not be obtained. Pt and family were instructed to obtain it outpatient, as initial implant was placed in [place]. Overnight, rapid responses were called 3 times. During the initial rapid response, pt was given 2mg Ativan and seizure resolved. During the subsequent seizures, pt resolved with simple saline flushes. During the following day, 2 rapid responses were called. Pt was again given 2mg Ativan IVP, and seizure resolved. During the subsequent seizure, pt was given 2 saline flushes during which her seizure resolved. Pt was transferred to [facility] for video EEG monitoring as recommended by neurology.     Patient improved clinically throughout hospital course. Patient seen and examined on day of discharge.    Vital Signs Last 24 Hrs  T(C): 36.8 (16 Aug 2018 07:30), Max: 37.6 (15 Aug 2018 23:24)  T(F): 98.3 (16 Aug 2018 07:30), Max: 99.6 (15 Aug 2018 23:24)  HR: 66 (16 Aug 2018 07:30) (66 - 85)  BP: 123/81 (16 Aug 2018 07:30) (107/74 - 125/89)  BP(mean): --  RR: 18 (16 Aug 2018 07:30) (18 - 18)  SpO2: 100% (16 Aug 2018 07:30) (98% - 100%)    Physical Exam:  General: Well developed, well nourished, NAD  HEENT: NCAT, PERRLA, EOMI bl, moist mucous membranes   Neck: Supple, nontender, no mass  Neurology: A&Ox3, nonfocal, CN II-XII grossly intact, sensation intact  Respiratory: CTA B/L, No W/R/R  CV: RRR, +S1/S2, no murmurs, rubs or gallops  Abdominal: Soft, NT, ND +BSx4  Extremities: No C/C/E, + peripheral pulses  MSK: Normal ROM, no joint erythema or warmth, no joint swelling   Skin: warm, dry, normal color, no rash or abnormal lesions    Patient is medically stable for transfer to [facility] for video EEG monitoring.  Completion of discharge in 35 minutes.

## 2018-08-15 NOTE — CHART NOTE - NSCHARTNOTEFT_GEN_A_CORE
Resident Rapid Response Note Follow up 2hr     Rapid response was called at 3:38pm for seizure.    Patient was seen and examined at the bedside in NAD. Denies any cp, sob, N/V/D, abd pain.       T(C): 37.2 (08-15-18 @ 16:28), Max: 37.3 (08-15-18 @ 00:34)  HR: 79 (08-15-18 @ 16:28) (48 - 85)  BP: 124/85 (08-15-18 @ 16:28) (109/75 - 138/88)  RR: 18 (08-15-18 @ 16:28) (12 - 18)  SpO2: 99% (08-15-18 @ 16:28) (97% - 100%)  Wt(kg): --    Physical Exam:  General: NAD  HEENT: NCAT, PERRLA, EOMI bl, moist mucous membranes   Neck: Supple, nontender, no mass  Neurology: AOx3, nonfocal  Respiratory: CTA B/L, No W/R/R  CV: RRR, +S1/S2, no murmurs, rubs or gallops  Abdominal: Soft, NT, ND +BSx4  Extremities: No C/C/E, + peripheral pulses  MSK: normal motor strength 5/5 upper and lower ext        Assessment/Plan: Pt is a 43-year-old with history of substance abuse and multiple seizures with status epilepticus s/p RRT 2 hr prior for seizure, found to have pseudoseizures given presentation and history of polysubstance abuse   - pt is stable  - no complaints at this time  - continue to monitor  -Will continue to follow, RN to call if any changes    Dr Mendez  PGY2  [phone]

## 2018-08-15 NOTE — CHART NOTE - NSCHARTNOTEFT_GEN_A_CORE
Resident Rapid Response Note    Rapid response was called at 11:24 for seizure.    Patient was seen and examined at the bedside by the rapid response team and resident medicine team. ICU at bedside. Patient was given 3 doses IV ativan and placed on 500 dilantin.     Rapid Response Vital Signs:  1st set:  BP: 139/98  HR:  89  RR:26  Temp:99.2  SpO2:97% on RA  FS: 128    Second set:   BP: 128/82  HR: 83  RR: 22  SpO2: 99% on 2L [place]  Temp: 99.7    Vital Signs Last 24 Hrs  T(C): 36.8 (15 Aug 2018 07:45), Max: 37.3 (15 Aug 2018 00:34)  T(F): 98.3 (15 Aug 2018 07:45), Max: 99.2 (15 Aug 2018 05:32)  HR: 85 (15 Aug 2018 07:45) (48 - 85)  BP: 109/75 (15 Aug 2018 07:45) (104/66 - 138/88)  BP(mean): --  RR: 14 (15 Aug 2018 07:45) (12 - 16)  SpO2: 98% (15 Aug 2018 07:45) (97% - 100%)    Physical Exam:  General: Well developed, well nourished, NAD  HEENT: NCAT, PERRLA, EOMI bl, moist mucous membranes   Neck: Supple, nontender, no mass  Neurology: A&Ox3, nonfocal, CN II-XII grossly intact, sensation intact, no gait abnormalities   Respiratory: CTA B/L, No W/R/R  CV: RRR, +S1/S2, no murmurs, rubs or gallops  Abdominal: Soft, NT, ND +BSx4  Extremities: No C/C/E, + peripheral pulses  MSK: Normal ROM, no joint erythema or warmth, no joint swelling   Skin: warm, dry, normal color, no rash or abnormal lesions      Assessment/Plan:    - f/u ABG  -Dr. Gifford aware   - Continue to monitor for active seizures  -Will continue to follow, RN to call if any changes. Resident Rapid Response Note    Rapid response was called at 11:24 for seizure.    Patient was seen and examined at the bedside by the rapid response team and resident medicine team. ICU at bedside. Patient was given 3 doses IV ativan and placed on 500 dilantin IV/one hour.    Rapid Response Vital Signs:  1st set:  BP: 139/98  HR:  89  RR:26  Temp:99.2  SpO2:97% on RA  FS: 128    Second set:   BP: 128/82  HR: 83  RR: 22  SpO2: 99% on 2L NC  Temp: 99.7    Vital Signs Last 24 Hrs  T(C): 36.8 (15 Aug 2018 07:45), Max: 37.3 (15 Aug 2018 00:34)  T(F): 98.3 (15 Aug 2018 07:45), Max: 99.2 (15 Aug 2018 05:32)  HR: 85 (15 Aug 2018 07:45) (48 - 85)  BP: 109/75 (15 Aug 2018 07:45) (104/66 - 138/88)  BP(mean): --  RR: 14 (15 Aug 2018 07:45) (12 - 16)  SpO2: 98% (15 Aug 2018 07:45) (97% - 100%)    Physical Exam:  General: Well developed, well nourished, NAD  HEENT: NCAT, PERRLA, EOMI bl, moist mucous membranes   Neck: Supple, nontender, no mass  Neurology: A&Ox3, nonfocal, CN II-XII grossly intact, sensation intact, no gait abnormalities   Respiratory: CTA B/L, No W/R/R  CV: RRR, +S1/S2, no murmurs, rubs or gallops  Abdominal: Soft, NT, ND +BSx4  Extremities: No C/C/E, + peripheral pulses  MSK: Normal ROM, no joint erythema or warmth, no joint swelling   Skin: warm, dry, normal color, no rash or abnormal lesions      Assessment/Plan:    - f/u ABG - monitor respiratory status  - Dr. Gifford aware   - Continue to monitor for active seizures  - Ativan 2mg PRN   - Seizure precautions   -Will continue to follow, RN to call if any changes. Resident Rapid Response Note    Rapid response was called at 11:24 for seizure.  Patient was seen and examined at the bedside by the rapid response team and resident medicine team. ICU at bedside. Patient was given 3 doses Ativan 2mg IV and placed on 500 Dilantin IV infused over one hour.    Rapid Response Vital Signs:  1st set:  BP: 139/98  HR:  89  RR:26  Temp:99.2  SpO2:97% on RA  FS: 128    Second set:   BP: 128/82  HR: 83  RR: 22  SpO2: 99% on 2L NC  Temp: 99.7    Vital Signs Last 24 Hrs  T(C): 36.8 (15 Aug 2018 07:45), Max: 37.3 (15 Aug 2018 00:34)  T(F): 98.3 (15 Aug 2018 07:45), Max: 99.2 (15 Aug 2018 05:32)  HR: 85 (15 Aug 2018 07:45) (48 - 85)  BP: 109/75 (15 Aug 2018 07:45) (104/66 - 138/88)  BP(mean): --  RR: 14 (15 Aug 2018 07:45) (12 - 16)  SpO2: 98% (15 Aug 2018 07:45) (97% - 100%)    Physical Exam:  General: nonverbal, actively seizing  HEENT: NCAT, PERRLA, EOMI bl, moist mucous membranes   Neck: Supple, nontender, no masses  Neurology: nonresponsive to painful stimuli   Respiratory: CTA B/L, No W/R/R. Abnormal respiration pattern with brief breathholding spells.  CV: RRR, +S1/S2, no murmurs, rubs or gallops  Extremities: B/L UE flexed and drawn against chest, No C/C/E, + peripheral pulses  Skin: warm, diaphoretic      Assessment/Plan:    - f/u ABG - monitor respiratory status  - neuro (Dr. Weir) aware   - Continue to monitor for active seizures  - Ativan 2mg PRN   - Seizure precautions   - Will continue to follow, RN to call if any changes. Resident Rapid Response Note    Rapid response was called at 11:24 for seizure.  Patient was seen and examined at the bedside by the rapid response team and resident medicine team. ICU at bedside.     Rapid Response Vital Signs:  1st set:  BP: 139/98  HR:  89  RR:26  Temp:99.2  SpO2:97% on RA  FS: 128    Second set:   BP: 128/82  HR: 83  RR: 22  SpO2: 99% on 2L NC  Temp: 99.7    Vital Signs Last 24 Hrs  T(C): 36.8 (15 Aug 2018 07:45), Max: 37.3 (15 Aug 2018 00:34)  T(F): 98.3 (15 Aug 2018 07:45), Max: 99.2 (15 Aug 2018 05:32)  HR: 85 (15 Aug 2018 07:45) (48 - 85)  BP: 109/75 (15 Aug 2018 07:45) (104/66 - 138/88)  BP(mean): --  RR: 14 (15 Aug 2018 07:45) (12 - 16)  SpO2: 98% (15 Aug 2018 07:45) (97% - 100%)    Physical Exam:  General: nonverbal, actively seizing  HEENT: NCAT  Neurology: nonresponsive to painful stimuli   Respiratory: CTA B/L, No W/R/R. Abnormal respiration pattern with brief breathholding spells.  CV: RRR, +S1/S2, no murmurs, rubs or gallops  Extremities: B/L UE flexed and drawn against chest, No C/C/E, + peripheral pulses  Skin: warm, diaphoretic      Assessment/Plan:  43F with PMH of epilepsy and anxiety presents to ED from [facility] admitted for multiple seizures with rapid called for seizure.     - s/p 3 doses Ativan 2mg IV   - 500 Dilantin IV to be infused over one hour.  - f/u ABG - monitor respiratory status  - neuro (Dr. Weir) aware   - Continue to monitor for active seizures  - Ativan 2mg PRN   - Seizure precautions   - Will continue to follow, RN to call if any changes. Resident Rapid Response Note    Rapid response was called at 11:24 for seizure.  Patient was seen and examined at the bedside by the rapid response team and resident medicine team. ICU at bedside.     Rapid Response Vital Signs:  1st set:  BP: 139/98  HR:  89  RR:26  Temp:99.2  SpO2:97% on RA  FS: 128    Second set:   BP: 128/82  HR: 83  RR: 22  SpO2: 99% on 2L NC  Temp: 99.7    Vital Signs Last 24 Hrs  T(C): 36.8 (15 Aug 2018 07:45), Max: 37.3 (15 Aug 2018 00:34)  T(F): 98.3 (15 Aug 2018 07:45), Max: 99.2 (15 Aug 2018 05:32)  HR: 85 (15 Aug 2018 07:45) (48 - 85)  BP: 109/75 (15 Aug 2018 07:45) (104/66 - 138/88)  BP(mean): --  RR: 14 (15 Aug 2018 07:45) (12 - 16)  SpO2: 98% (15 Aug 2018 07:45) (97% - 100%)    Physical Exam:  General: nonverbal, actively seizing  HEENT: NCAT  Neurology: nonresponsive to painful stimuli   Respiratory: CTA B/L, No W/R/R. Abnormal respiration pattern with brief breathholding spells.  CV: RRR, +S1/S2, no murmurs, rubs or gallops  Extremities: B/L UE flexed and drawn against chest, No C/C/E, + peripheral pulses  Skin: warm, diaphoretic      Assessment/Plan:  43F with PMH of substance abuse, epilepsy and anxiety presents to ED from [facility] admitted for multiple seizures with rapid called for seizure.     - s/p 3 doses Ativan 2mg IV   - 500 Dilantin IV to be infused over one hour.  - f/u ABG - monitor respiratory status  - neuro (Dr. Weir) aware   - Continue to monitor for active seizures  - Ativan 2mg PRN   - Seizure precautions   - Will continue to follow, RN to call if any changes.

## 2018-08-15 NOTE — PROGRESS NOTE ADULT - ATTENDING COMMENTS
Pt. seen and evaluated for seizure disorder.  Pt. noted to have another seizure this morning.  Prior was feeling well and with no new concerns.  Physical examination as above.     Plan:  -Seizure d/o: started on Xanax 0.5mg PO BID and Lamictal 125mg PO BID.  Continue Vimpat 200mg PO BID, Keppra 1500mg IV Q12h, and IV Ativan for breakthrough seizures.  Seizure precautions.  Check EEG results.  Neurology f/u    -Polysubstance drug abuse:  Clonidine 0.1mg PO Q4h PRN.  Continue thiamine, folate, and MVI  -Nicotine dependance:  Nicotine 7mg patch daily and nicotine gum 2mg PO Q1h PRN  -Anxiety d/o: Continue Celexa  -Elevated LFTs:  mild elevation.    -HLD:  check fasting lipid profile  -VTE ppx:  SCD

## 2018-08-15 NOTE — CHART NOTE - NSCHARTNOTEFT_GEN_A_CORE
Resident Rapid Response Followup Note    Patient is a 43y old Female who presents with a chief complaint of seizures.     Rapid response was called at on this 43y Female patient for  seizure.  Patient was seen and examined at the bedside by the rapid response team and primary team.  ___ () at bedside.    Rapid Response Vital Signs:  BP:  HR:  RR:  SpO2: % on   Temp:  FS:    Vital Signs Last 24 Hrs  T(C): 36.9 (15 Aug 2018 12:25), Max: 37.3 (15 Aug 2018 00:34)  T(F): 98.5 (15 Aug 2018 12:25), Max: 99.2 (15 Aug 2018 05:32)  HR: 83 (15 Aug 2018 12:25) (48 - 85)  BP: 125/89 (15 Aug 2018 12:25) (104/66 - 138/88)  BP(mean): --  RR: 18 (15 Aug 2018 12:25) (12 - 18)  SpO2: 99% (15 Aug 2018 12:25) (97% - 100%)    Physical Exam:  General: Well developed, well nourished, NAD  HEENT: NCAT, PERRLA, EOMI bl, moist mucous membranes   Neck: Supple, nontender, no mass  Neurology: A&Ox3, nonfocal, CN II-XII grossly intact, sensation intact, no gait abnormalities   Respiratory: CTA B/L, No W/R/R  CV: RRR, +S1/S2, no murmurs, rubs or gallops  Abdominal: Soft, NT, ND +BSx4  Extremities: No C/C/E, + peripheral pulses  MSK: Normal ROM, no joint erythema or warmth, no joint swelling   Skin: warm, dry, normal color, no rash or abnormal lesions    LABS:                        12.7   7.54  )-----------( 250      ( 14 Aug 2018 15:49 )             36.5     15 Aug 2018 06:49    140    |  106    |  11     ----------------------------<  99     4.0     |  27     |  0.88     Ca    8.4        15 Aug 2018 06:49    TPro  6.3    /  Alb  3.2    /  TBili  0.2    /  DBili  x      /  AST  63     /  ALT  124    /  AlkPhos  41     15 Aug 2018 06:49    PT/INR - ( 14 Aug 2018 15:49 )   PT: 11.3 sec;   INR: 1.04 ratio         PTT - ( 14 Aug 2018 15:49 )  PTT:29.4 sec    CAPILLARY BLOOD GLUCOSE      POCT Blood Glucose.: 128 mg/dL (15 Aug 2018 11:35)    ABG - ( 15 Aug 2018 11:52 )  pH, Arterial: 7.44  pH, Blood: x     /  pCO2: 36    /  pO2: 118   / HCO3: 26    / Base Excess: 0.9   /  SaO2: 99                  RADIOLOGY & ADDITIONAL TESTS:      Assessment/Plan:      -Will continue to follow, RN to call if any changes.    Dr Mendez  PGY1  [phone] Resident Rapid Response Followup Note    Patient is a 43y old Female who presents with a chief complaint of seizures.     Rapid response had been called at on this 43y Female patient for seizure at 11:24.  Patient was seen and examined at the bedside by the ICU PA, rapid response team and primary team.   Patient was given 3 doses Ativan 2mg IV and placed on 500 Dilantin IV infused over one hour.   Pt currently AAOx3 with no acute complaints. Family is at bedside. Pt admits to mild HA, which she attributes to her fall before admission. Denies dizziness, CP, SOB.    Post Response Vital Signs:  BP: 125/89  HR: 83  RR: 18  SpO2: 99% on RA  Temp: 98.3 F  FS:    Vital Signs Last 24 Hrs  T(C): 36.9 (15 Aug 2018 12:25), Max: 37.3 (15 Aug 2018 00:34)  T(F): 98.5 (15 Aug 2018 12:25), Max: 99.2 (15 Aug 2018 05:32)  HR: 83 (15 Aug 2018 12:25) (48 - 85)  BP: 125/89 (15 Aug 2018 12:25) (104/66 - 138/88)  BP(mean): --  RR: 18 (15 Aug 2018 12:25) (12 - 18)  SpO2: 99% (15 Aug 2018 12:25) (97% - 100%)    Physical Exam:  General: Well developed, well nourished, NAD  HEENT: NCAT, PERRLA, EOMI bl, moist mucous membranes   Neck: Supple, nontender, no mass  Neurology: A&Ox3, nonfocal, CN II-XII grossly intact, sensation intact  Respiratory: CTA B/L, No W/R/R  CV: RRR, +S1/S2, no murmurs, rubs or gallops  Abdominal: Soft, NT, ND +BSx4  Extremities: No C/C/E, + peripheral pulses  MSK: Normal ROM, no joint erythema or warmth, no joint swelling   Skin: warm, dry, normal color, no rash or abnormal lesions    LABS:                        12.7   7.54  )-----------( 250      ( 14 Aug 2018 15:49 )             36.5     15 Aug 2018 06:49    140    |  106    |  11     ----------------------------<  99     4.0     |  27     |  0.88     Ca    8.4        15 Aug 2018 06:49    TPro  6.3    /  Alb  3.2    /  TBili  0.2    /  DBili  x      /  AST  63     /  ALT  124    /  AlkPhos  41     15 Aug 2018 06:49    PT/INR - ( 14 Aug 2018 15:49 )   PT: 11.3 sec;   INR: 1.04 ratio         PTT - ( 14 Aug 2018 15:49 )  PTT:29.4 sec    CAPILLARY BLOOD GLUCOSE      POCT Blood Glucose.: 128 mg/dL (15 Aug 2018 11:35)    ABG - ( 15 Aug 2018 11:52 )  pH, Arterial: 7.44  pH, Blood: x     /  pCO2: 36    /  pO2: 118   / HCO3: 26    / Base Excess: 0.9   /  SaO2: 99                  RADIOLOGY & ADDITIONAL TESTS:      Assessment/Plan:  - ABG: WNL - monitor respiratory status  - neuro (Dr. Weir) aware   - Continue to monitor for active seizures  - Ativan 2mg IVP PRN   - Seizure precautions     -Will continue to follow, RN to call if any changes.    Dr. Escalona  PGY1

## 2018-08-15 NOTE — PROGRESS NOTE ADULT - SUBJECTIVE AND OBJECTIVE BOX
Neurology follow up note    JOHN HUMMEL43yFemale      Interval History:    Patient feels ok no new complaints.    MEDICATIONS    celecoxib 200 milliGRAM(s) Oral two times a day with meals  citalopram 40 milliGRAM(s) Oral daily  cloNIDine 0.1 milliGRAM(s) Oral every 4 hours PRN  folic acid 1 milliGRAM(s) Oral daily  hydrOXYzine hydrochloride 25 milliGRAM(s) Oral every 8 hours PRN  lacosamide 200 milliGRAM(s) Oral two times a day  lamoTRIgine 100 milliGRAM(s) Oral two times a day  levETIRAcetam  IVPB 1500 milliGRAM(s) IV Intermittent every 12 hours  LORazepam   Injectable 2 milliGRAM(s) IV Push every 6 hours PRN  multivitamin 1 Tablet(s) Oral daily  nicotine  Polacrilex Gum 2 milliGRAM(s) Oral every 1 hour PRN  nicotine -   7 mG/24Hr(s) Patch 1 patch Transdermal daily  thiamine 100 milliGRAM(s) Oral daily      Allergies    No Known Allergies    Intolerances        Height (cm): 154.94 (08-14 @ 14:58)  Weight (kg): 54.4 (08-14 @ 14:58)  BMI (kg/m2): 22.7 (08-14 @ 14:58)    Vital Signs Last 24 Hrs  T(C): 36.8 (15 Aug 2018 07:45), Max: 37.3 (15 Aug 2018 00:34)  T(F): 98.3 (15 Aug 2018 07:45), Max: 99.2 (15 Aug 2018 05:32)  HR: 85 (15 Aug 2018 07:45) (48 - 85)  BP: 109/75 (15 Aug 2018 07:45) (104/66 - 138/88)  BP(mean): --  RR: 14 (15 Aug 2018 07:45) (12 - 16)  SpO2: 98% (15 Aug 2018 07:45) (97% - 100%)      REVIEW OF SYSTEMS:     Constitutional: No fever, chills, fatigue, weakness  Eyes: no eye pain, visual disturbances, or discharge  ENT:  No difficulty hearing, tinnitus, vertigo; No sinus or throat pain  Neck: No pain or stiffness  Respiratory: No cough, dyspnea, wheezing   Cardiovascular: No chest pain, palpitations,   Gastrointestinal: No abdominal or epigastric pain. No nausea, vomiting  No diarrhea or constipation.   Genitourinary: No dysuria, frequency, hematuria or incontinence  Neurological: No headaches, lightheadedness, vertigo, numbness or tremors  Psychiatric: No depression, anxiety, mood swings or difficulty sleeping  Musculoskeletal: No joint pain or swelling; No muscle, back or extremity pain  Skin: No itching, burning, rashes or lesions   Lymph Nodes: No enlarged glands  Endocrine: No heat or cold intolerance; No hair loss   Allergy and Immunologic: No hives or eczema    On Neurological Examination:    Mental Status - Patient is alert, awake, oriented X3.       Follow simple commands  Follow complex commands    Speech -   Fluent                      Cranial Nerves - Pupils 3 mm equal and reactive to light,   extraocular eye movements intact.   smile symmetric  intact bilateral NLF    Motor Exam -   Right upper 5/5  Left upper 5/5  Right lower 5/5  Left lower 5/5    Muscle tone - is normal all over.  No asymmetry is seen.      Sensory    Bilateral intact to light touch    GENERAL Exam: Nontoxic , No Acute Distress   	  HEENT:  normocephalic, atraumatic  		  LUNGS: Clear bilaterally    	  HEART: Normal S1S2   No murmur RRR        	  GI/ ABDOMEN:  Soft  Non tender    EXTREMITIES:   No Edema  No Clubbing  No Cyanosis     MUSCULOSKELETAL: Normal Range of Motion  	   SKIN: Normal  No Ecchymosis               LABS:  CBC Full  -  ( 14 Aug 2018 15:49 )  WBC Count : 7.54 K/uL  Hemoglobin : 12.7 g/dL  Hematocrit : 36.5 %  Platelet Count - Automated : 250 K/uL  Mean Cell Volume : 90.6 fl  Mean Cell Hemoglobin : 31.5 pg  Mean Cell Hemoglobin Concentration : 34.8 gm/dL  Auto Neutrophil # : 4.78 K/uL  Auto Lymphocyte # : 1.95 K/uL  Auto Monocyte # : 0.58 K/uL  Auto Eosinophil # : 0.20 K/uL  Auto Basophil # : 0.01 K/uL  Auto Neutrophil % : 63.3 %  Auto Lymphocyte % : 25.9 %  Auto Monocyte % : 7.7 %  Auto Eosinophil % : 2.7 %  Auto Basophil % : 0.1 %      08-15    140  |  106  |  11  ----------------------------<  99  4.0   |  27  |  0.88    Ca    8.4<L>      15 Aug 2018 06:49    TPro  6.3  /  Alb  3.2<L>  /  TBili  0.2  /  DBili  x   /  AST  63<H>  /  ALT  124<H>  /  AlkPhos  41  08-15    Hemoglobin A1C:     LIVER FUNCTIONS - ( 15 Aug 2018 06:49 )  Alb: 3.2 g/dL / Pro: 6.3 g/dL / ALK PHOS: 41 U/L / ALT: 124 U/L / AST: 63 U/L / GGT: x           Vitamin B12   PT/INR - ( 14 Aug 2018 15:49 )   PT: 11.3 sec;   INR: 1.04 ratio         PTT - ( 14 Aug 2018 15:49 )  PTT:29.4 sec      RADIOLOGY    ANALYSIS AND PLAN:  This is a 43-year-old with history of substance abuse and now multiple seizures status epilepticus.  1.	For episode of status epilepticus, unclear what the patient was abusing, it appears that at one point the patient was on Xanax from what I could ascertain from a documentation sheet, as she takes 0.5 of Xanax a day and was up to 80 of OxyContin, questionable this could be substance withdrawal which is less likely secondary to very low dose of Xanax, questionable this could be any type of alcohol withdrawal.  It appears that she drinks two pints of Vodka or 4 to 7 beers daily.  It appeared that the patient was detoxified in the past at [facility].  Questionable breakthrough seizures from underlying epilepsy.  In light of the patient had recurrent seizures, I will make adjustments to her medications.  I will continue the patient on her Vimpat and Lamictal.  I will increase the patient's Keppra to 1500 mg two times a day.  2.	I would recommend Ativan 2 mg IV push every 6 hours as needed for any breakthrough seizures.  3.	I would recommend seizure precautions.  4.	Monitor the patient's electrolytes.  5.	will check EEG  6.	spoke to patient to bring in magnetic for VNS   7.	would recommend detoxification program.    Thank you for the courtesy of consultation.    Physical therapy evaluation.  OOB to chair/ambulation with assistance only.    Greater than 45 minutes spent in direct patient care reviewing  the notes, lab data/ imaging , discussion with multidisciplinary team.

## 2018-08-15 NOTE — PROGRESS NOTE ADULT - PROBLEM SELECTOR PLAN 3
- no CIWA reported on ED admission  - last reported alcohol use 8/3/18  - Continue multivitamins, thiamine and folic acid

## 2018-08-15 NOTE — DISCHARGE NOTE ADULT - MEDICATION SUMMARY - MEDICATIONS TO CHANGE
I will SWITCH the dose or number of times a day I take the medications listed below when I get home from the hospital:    Keppra 1000 mg oral tablet  -- 1 tab(s) by mouth 2 times a day    LaMICtal 100 mg oral tablet  -- 1 tab(s) by mouth 2 times a day

## 2018-08-15 NOTE — PROGRESS NOTE ADULT - SUBJECTIVE AND OBJECTIVE BOX
Resident Progress Note    Chief Complaint: Patient is a 43y old  Female who presents with a chief complaint of seizures.      HPI: Patient seen and examined at bedside. No acute events overnight. Denies any HA, dizziness, CP, palpitations, SOB, cough, N/V/D.    ROS:  Constitutional: denies fever, chills, diaphoresis   HEENT: denies blurry vision, difficulty hearing  Respiratory: denies SOB, CRENSHAW, cough, sputum production  Cardiovascular: denies chest pain, palpitations  Gastrointestinal: denies nausea, vomiting, diarrhea, constipation, abdominal pain  Genitourinary: denies dysuria, frequency, urgency, hematuria   Skin/Breast: denies rash, itching  Musculoskeletal: denies myalgias, joint swelling, muscle weakness  Neurologic: denies headache, weakness, dizziness, paresthesias, numbness/tingling    ROS negative except as noted above    Vital Signs Last 24 Hrs  T(C): 36.9 (08-15-18 @ 12:25), Max: 37.3 (08-15-18 @ 00:34)  T(F): 98.5 (08-15-18 @ 12:25), Max: 99.2 (08-15-18 @ 05:32)  HR: 83 (08-15-18 @ 12:25) (48 - 85)  BP: 125/89 (08-15-18 @ 12:25) (104/66 - 138/88)  BP(mean): --  RR: 18 (08-15-18 @ 12:25) (12 - 18)  SpO2: 99% (08-15-18 @ 12:25) (97% - 100%)    Physical Exam:  General: Well developed, well nourished, NAD  HEENT: NCAT, PERRLA, EOMI bl, moist mucous membranes   Neck: Supple, nontender, no mass  Neurology: A&Ox3, nonfocal, CN II-XII grossly intact, sensation intact  Respiratory: CTA B/L, No W/R/R  CV: RRR, +S1/S2, no murmurs, rubs or gallops  Abdominal: Soft, NT, ND +BSx4  Extremities: No C/C/E, + peripheral pulses  MSK: Normal ROM, no joint erythema or warmth, no joint swelling   Skin: warm, dry, normal color, no rash or abnormal lesions    LABS:                        12.7   7.54  )-----------( 250      ( 14 Aug 2018 15:49 )             36.5     15 Aug 2018 06:49    140    |  106    |  11     ----------------------------<  99     4.0     |  27     |  0.88     Ca    8.4        15 Aug 2018 06:49    TPro  6.3    /  Alb  3.2    /  TBili  0.2    /  DBili  x      /  AST  63     /  ALT  124    /  AlkPhos  41     15 Aug 2018 06:49    PT/INR - ( 14 Aug 2018 15:49 )   PT: 11.3 sec;   INR: 1.04 ratio         PTT - ( 14 Aug 2018 15:49 )  PTT:29.4 sec        CAPILLARY BLOOD GLUCOSE      POCT Blood Glucose.: 128 mg/dL (15 Aug 2018 11:35)        RADIOLOGY & ADDITIONAL TESTS:    MEDICATIONS  (STANDING):  ALPRAZolam 0.5 milliGRAM(s) Oral two times a day  celecoxib 200 milliGRAM(s) Oral two times a day with meals  citalopram 40 milliGRAM(s) Oral daily  folic acid 1 milliGRAM(s) Oral daily  lacosamide 200 milliGRAM(s) Oral two times a day  lamoTRIgine 125 milliGRAM(s) Oral two times a day  levETIRAcetam  IVPB 1500 milliGRAM(s) IV Intermittent every 12 hours  LORazepam   Injectable 2 milliGRAM(s) IV Push once  LORazepam   Injectable 2 milliGRAM(s) IV Push once  LORazepam   Injectable 2 milliGRAM(s) IV Push once  multivitamin 1 Tablet(s) Oral daily  nicotine -   7 mG/24Hr(s) Patch 1 patch Transdermal daily  thiamine 100 milliGRAM(s) Oral daily    MEDICATIONS  (PRN):  cloNIDine 0.1 milliGRAM(s) Oral every 4 hours PRN Breakthrough withdrawal  hydrOXYzine hydrochloride 25 milliGRAM(s) Oral every 8 hours PRN Anxiety  LORazepam   Injectable 2 milliGRAM(s) IV Push every 6 hours PRN Seizures  nicotine  Polacrilex Gum 2 milliGRAM(s) Oral every 1 hour PRN Nicotine dependence      Allergies    No Known Allergies    Intolerances Resident Progress Note    Chief Complaint: Patient is a 43y old  Female who presents with a chief complaint of seizures.      HPI: Patient seen and examined at bedside. No acute events overnight. Denies any HA, dizziness, CP, palpitations, SOB, cough, N/V/D.    ROS:  Constitutional: denies fever, chills, diaphoresis   HEENT: denies blurry vision, difficulty hearing  Respiratory: denies SOB, CRENSHAW, cough, sputum production  Cardiovascular: denies chest pain, palpitations  Gastrointestinal: denies nausea, vomiting, diarrhea, constipation, abdominal pain  Genitourinary: denies dysuria, frequency, urgency, hematuria   Skin/Breast: denies rash, itching  Musculoskeletal: denies myalgias, joint swelling, muscle weakness  Neurologic: denies headache, weakness, dizziness, paresthesias, numbness/tingling    ROS negative except as noted above    Vital Signs Last 24 Hrs  T(C): 36.9 (08-15-18 @ 12:25), Max: 37.3 (08-15-18 @ 00:34)  T(F): 98.5 (08-15-18 @ 12:25), Max: 99.2 (08-15-18 @ 05:32)  HR: 83 (08-15-18 @ 12:25) (48 - 85)  BP: 125/89 (08-15-18 @ 12:25) (104/66 - 138/88)  BP(mean): --  RR: 18 (08-15-18 @ 12:25) (12 - 18)  SpO2: 99% (08-15-18 @ 12:25) (97% - 100%)    Physical Exam:  General: Well developed, well nourished, NAD  HEENT: NCAT, PERRLA, EOMI bl, moist mucous membranes   Neck: Supple, nontender, no mass  Neurology: A&Ox3, nonfocal, CN II-XII grossly intact, sensation intact, equal and symmetric strength of extremities  Respiratory: CTA B/L, No W/R/R  CV: RRR, +S1/S2, no murmurs, rubs or gallops  Abdominal: Soft, NT, ND +BSx4  Extremities: No C/C/E, + peripheral pulses  MSK: Normal ROM, no joint erythema or warmth, no joint swelling   Skin: warm, dry, normal color, no rash or abnormal lesions    LABS:                        12.7   7.54  )-----------( 250      ( 14 Aug 2018 15:49 )             36.5     15 Aug 2018 06:49    140    |  106    |  11     ----------------------------<  99     4.0     |  27     |  0.88     Ca    8.4        15 Aug 2018 06:49    TPro  6.3    /  Alb  3.2    /  TBili  0.2    /  DBili  x      /  AST  63     /  ALT  124    /  AlkPhos  41     15 Aug 2018 06:49    PT/INR - ( 14 Aug 2018 15:49 )   PT: 11.3 sec;   INR: 1.04 ratio         PTT - ( 14 Aug 2018 15:49 )  PTT:29.4 sec        CAPILLARY BLOOD GLUCOSE      POCT Blood Glucose.: 128 mg/dL (15 Aug 2018 11:35)        RADIOLOGY & ADDITIONAL TESTS:    MEDICATIONS  (STANDING):  ALPRAZolam 0.5 milliGRAM(s) Oral two times a day  celecoxib 200 milliGRAM(s) Oral two times a day with meals  citalopram 40 milliGRAM(s) Oral daily  folic acid 1 milliGRAM(s) Oral daily  lacosamide 200 milliGRAM(s) Oral two times a day  lamoTRIgine 125 milliGRAM(s) Oral two times a day  levETIRAcetam  IVPB 1500 milliGRAM(s) IV Intermittent every 12 hours  LORazepam   Injectable 2 milliGRAM(s) IV Push once  LORazepam   Injectable 2 milliGRAM(s) IV Push once  LORazepam   Injectable 2 milliGRAM(s) IV Push once  multivitamin 1 Tablet(s) Oral daily  nicotine -   7 mG/24Hr(s) Patch 1 patch Transdermal daily  thiamine 100 milliGRAM(s) Oral daily    MEDICATIONS  (PRN):  cloNIDine 0.1 milliGRAM(s) Oral every 4 hours PRN Breakthrough withdrawal  hydrOXYzine hydrochloride 25 milliGRAM(s) Oral every 8 hours PRN Anxiety  LORazepam   Injectable 2 milliGRAM(s) IV Push every 6 hours PRN Seizures  nicotine  Polacrilex Gum 2 milliGRAM(s) Oral every 1 hour PRN Nicotine dependence      Allergies    No Known Allergies    Intolerances

## 2018-08-15 NOTE — DISCHARGE NOTE ADULT - MEDICATION SUMMARY - MEDICATIONS TO TAKE
I will START or STAY ON the medications listed below when I get home from the hospital:    Mobic 15 mg oral tablet  -- 1 tab(s) by mouth once a day  -- Indication: For pain control    Mylanta oral suspension  -- 30 milliliter(s) by mouth 4 times a day (after meals and at bedtime), As Needed  -- Indication: For Dyspepsia    Milk of Magnesia 8% oral suspension  -- 30 milliliter(s) by mouth once a day (at bedtime), As Needed for constipation  -- Indication: For Constipation    Catapres 0.1 mg oral tablet  -- 1 tab(s) by mouth every 4 hours, As Needed for breathrough withdrawl   -- Indication: For Benzodiazepine dependence    Vimpat 50 mg oral tablet  -- 4 tab(s) by mouth 2 times a day  -- Indication: For Seizure    lamoTRIgine 25 mg oral tablet  -- 5 tab(s) by mouth 2 times a day  -- Indication: For Seizure    Keppra 500 mg oral tablet  -- 3 tab(s) by mouth 2 times a day  -- Indication: For Seizure    CeleXA 40 mg oral tablet  -- 1 tab(s) by mouth once a day  -- Indication: For Anxiety    Desyrel 50 mg oral tablet  -- 1 tab(s) by mouth once a day (at bedtime), As Needed for insomnia   -- Indication: For insomnia    Vistaril 25 mg oral capsule  -- 1 cap(s) by mouth every 8 hours, As Needed  -- Indication: For Anxiety    Dulcolax Laxative 5 mg oral delayed release tablet  -- 1 tab(s) by mouth once a day, As Needed for constipation  -- Indication: For Constipation    Nicorette 2 mg oral transmucosal gum  -- 2 gum oral transmucosal every 2 hours, As Needed  -- Indication: For Nicotine dependence    Nicoderm 7 mg/24 hr transdermal film, extended release  -- 1 patch by transdermal patch once a day  -- Indication: For Nicotine dependence    Multiple Vitamins oral capsule  -- 1 cap(s) by mouth once a day  -- Indication: For Supplement    folic acid 1 mg oral tablet  -- 1 tab(s) by mouth once a day  -- Indication: For ETOH abuse    Vitamin B1 100 mg oral tablet  -- 1 tab(s) by mouth once a day  -- Indication: For ETOH abuse

## 2018-08-15 NOTE — PROGRESS NOTE ADULT - SUBJECTIVE AND OBJECTIVE BOX
events noted --- vague history --- as per my conversation with spouse patient had video EEG-- had events on video EEG of shaking was told no seizure discharges were found but she does have Epilepsy-- spoke to ICU team about 2nd event possible psychogenic seizure  event -- spoke to spouse jovanna  state her brother saima will  to keep updated -- [phone]--- if patient continues to have this event may need transfer for video EEG --  will mail magnet to brother  greater than 100 minutes spent with patient plan of care speaking to staff and family

## 2018-08-15 NOTE — CHART NOTE - NSCHARTNOTEFT_GEN_A_CORE
Rapid Response Follow-up Note Rapid Response Follow-up Note    Patient seen and examined at bedside. Patient states that she is feeling well. Denies chest pain, SOB, dizziness, N/V.        Vital Signs Last 24 Hrs  T(C): 37.1 (15 Aug 2018 20:13), Max: 37.3 (15 Aug 2018 00:34)  T(F): 98.7 (15 Aug 2018 20:13), Max: 99.2 (15 Aug 2018 05:32)  HR: 85 (15 Aug 2018 20:13) (63 - 85)  BP: 116/81 (15 Aug 2018 20:13) (109/75 - 125/89)  BP(mean): --  RR: 18 (15 Aug 2018 20:13) (12 - 18)  SpO2: 100% (15 Aug 2018 20:13) (98% - 100%)    Physical Exam:  General: NAD  HEENT: period nystagmus noted,  pupils dilated, constrict with light   Neck: Supple, nontender, no mass  Neurology: AOx3, nonfocal   Respiratory: CTA B/L, No W/R/R  CV: RRR, +S1/S2, no murmurs, rubs or gallops  Abdominal: Soft, NT, ND +BSx4  Extremities: No C/C/E, + peripheral pulses  MSK: normal motor strength 5/5 upper and lower ext      Assessment/Plan: Pt is a 43-year-old with history of substance abuse and multiple seizures with status epilepticus s/p RRT 2 hr prior for seizure, found to have pseudoseizures given presentation and history of polysubstance abuse.   PT is currently stable   -Will continue to follow, RN to call if any changes  -Dr. Reyes contacted [facility], plan to contact epilepsy team [facility] in AM Rapid Response Follow-up Note    Patient seen and examined at bedside. Patient states that she is feeling well. Denies chest pain, SOB, dizziness, N/V.        Vital Signs Last 24 Hrs  T(C): 37.1 (15 Aug 2018 20:13), Max: 37.3 (15 Aug 2018 00:34)  T(F): 98.7 (15 Aug 2018 20:13), Max: 99.2 (15 Aug 2018 05:32)  HR: 85 (15 Aug 2018 20:13) (63 - 85)  BP: 116/81 (15 Aug 2018 20:13) (109/75 - 125/89)  BP(mean): --  RR: 18 (15 Aug 2018 20:13) (12 - 18)  SpO2: 100% (15 Aug 2018 20:13) (98% - 100%)    Physical Exam:  General: NAD  HEENT: period nystagmus noted,  pupils dilated, constrict with light   Neck: Supple, nontender, no mass  Neurology: AOx3, nonfocal   Respiratory: CTA B/L, No W/R/R  CV: RRR, +S1/S2, no murmurs, rubs or gallops  Abdominal: Soft, NT, ND +BSx4  Extremities: No C/C/E, + peripheral pulses  MSK: normal motor strength 5/5 upper and lower ext      Assessment/Plan: Pt is a 43-year-old with history of substance abuse and multiple seizures with status epilepticus s/p RRT 2 hr prior for seizure, found to have pseudoseizures given presentation and history of polysubstance abuse.   PT is currently stable   -Will continue to follow, RN to call if any changes  -Attending, Dr. Reyes contacted [facility], plan to contact epilepsy team [facility] in AM

## 2018-08-15 NOTE — CHART NOTE - NSCHARTNOTEFT_GEN_A_CORE
Patient is a 43y old  Female who presents with a chief complaint of seizures (15 Aug 2018 16:49)      Rapid response was called at 19:24 on this 43yFemale patient for seizure. Of note, patient also had rapid response earlier in the day for seizure.     Patient was seen and examined at the bedside by the rapid response team, Dr. Reyes and ICU PA. Patient actively shaking with eyes closed. Patient given 2 mg ativan.     Rapid Response Vitals:  BP: 78/59 initially repeat 120/82  HR: 115 initially repeat 82   T: 98. 1  RR: 42 repeat 16   SpO2: 97%  FS: 102        PHYSICAL EXAM:  GENERAL: seizing   HEAD:  Atraumatic, Normocephalic  EYES: upon manually opening eyes, eyes moving downward   NERVOUS SYSTEM:  tensing bilateral upper extremities, not responsive to painful stimuli    CHEST/LUNG: Clear to auscultation bilaterally; No rales, rhonchi, wheezing, or rubs  HEART: Regular rate and rhythm; No murmurs, rubs, or gallops  ABDOMEN: Soft, Nontender, Nondistended  EXTREMITIES: clenching upper extremities   SKIN: No rashes or lesions        ASSESSMENT AND PLAN    Pt is a 43-year-old with history of substance abuse and multiple seizures. Rapid response earlier in day likely pseudoseizures, patient with history of alcohol/benzo abuse.  - After ativan 2 mg given, patient responding and seizing stopped  - Dr. Reyes at bedside, aware of plan, agrees  - RN to call if change in status Patient is a 43y old  Female who presents with a chief complaint of seizures (15 Aug 2018 16:49)      Rapid response was called at 19:24 on this 43yFemale patient for seizure. Of note, patient also had rapid response earlier in the day for seizure.     Patient was seen and examined at the bedside by the rapid response team, Dr. Reyes and ICU PA. Patient actively shaking with eyes closed. patient seized for approximately 4 minutes. Patient given 2 mg ativan.     Rapid Response Vitals:  BP: 78/59 initially repeat 120/82  HR: 115 initially repeat 82   T: 98. 1  RR: 16   SpO2: 97%  FS: 102        PHYSICAL EXAM:  GENERAL: seizing   HEAD:  Atraumatic, Normocephalic  EYES: upon manually opening eyes, eyes moving downward   NERVOUS SYSTEM:  tensing bilateral upper extremities, not responsive to painful stimuli    CHEST/LUNG: Clear to auscultation bilaterally; No rales, rhonchi, wheezing, or rubs  HEART: Regular rate and rhythm; No murmurs, rubs, or gallops  ABDOMEN: Soft, Nontender, Nondistended  EXTREMITIES: clenching upper extremities   SKIN: No rashes or lesions        ASSESSMENT AND PLAN    Pt is a 43-year-old with history of substance abuse and multiple seizures. Rapid response earlier in day likely pseudoseizures, patient with history of alcohol/benzo abuse.  - After ativan 2 mg given, patient responding and seizing stopped  - Dr. Reyes at bedside, aware of plan, agrees  - RN to call if change in status

## 2018-08-15 NOTE — PROGRESS NOTE ADULT - PROBLEM SELECTOR PLAN 1
- Ativan 2mg IVP q6h for breakthrough seizures  - monitor lytes  - seizure precautions  - - CT Head: negative for ICH or other acute pathology.  - f/u with neuro (Dr. Becerril)  - f/u EEG  - c/w Vimpat and Lamictal. Keppra increased to 1500mg BID.  - Ativan 2mg IVP q6h for breakthrough seizures  - monitor lytes  - seizure precautions  - discussed with pt's brother regarding availability of antiepileptic magnet. States he is unable to bring in magnets.

## 2018-08-15 NOTE — DISCHARGE NOTE ADULT - CARE PROVIDER_API CALL
Ady Weir), Neurology  [address]  [place] [address]  Phone: [phone] Neil Clark (MD), Clinical Neurophysiology; EEGEpilepsy; Neurology  [address] [address]  Phone: [phone]  Fax: [phone]

## 2018-08-15 NOTE — DISCHARGE NOTE ADULT - ADDITIONAL INSTRUCTIONS
-Please follow up with your primary doctor within one week.  -Please follow up with ____ outpatient (information below).  -Patient and family to set up follow up appointments.  -Continue taking your medications as directed.  -If symptoms persist/worsen, please call your PMD or return to the ED. -Please follow up with your primary doctor within one week.  -Please follow up with neurology outpatient (information below).  -Patient and family to set up follow up appointments.  -Continue taking your medications as directed.  -If symptoms persist/worsen, please call your PMD or return to the ED. -Please follow up with your primary doctor within one week.  -Patient and family to set up follow up appointments.  -Continue taking your medications as directed.  -If symptoms persist/worsen, please call your PMD or return to the ED.

## 2018-08-15 NOTE — DISCHARGE NOTE ADULT - PLAN OF CARE
stable You came in for multiple seizures. You were treated with Ativan as needed, and we increased your dose of Keppra. We continued you on your Vimpat and Lamictal. Please follow up with your neurologist as an outpatient and continue taking your medications as prescribed. Continue with Atarax. Continue with Catapres. You had elevated liver enzymes. We continued to monitor it during your stay, until it started trending down. Please follow up with your PMD as an outpatient to recheck your levels. Continue multivitamins, thiamine and folic acid. Continue with your nicotine patch and gum. Avoid high cholesterol diet. To have continuous video EEG monitoring You came in for multiple seizures. You were treated with Ativan as needed, and we increased your dose of Keppra and Lamictal. We continued you on your Vimpat.  You will be transferred to [facility] to be evaluated by Dr. Clark and to have continuous video EEG monitoring.  Activity per [facility] staff.  Regular diet. You came in for multiple seizures. You were treated with Ativan as needed, and we increased your dose of Keppra and Lamictal. We continued you on your Vimpat.  You will be transferred to [facility] to be evaluated by Dr. Clark and to have continuous video EEG monitoring.  Activity per [facility] staff.  Low cholesterol diet.

## 2018-08-15 NOTE — CHART NOTE - NSCHARTNOTEFT_GEN_A_CORE
Resident Rapid Response Note    Rapid response was called at 3:38 for seizure.    Patient was seen and examined at the bedside by the rapid response team and resident medicine team and ICU at bedside.    Rapid Response Vital Signs:  BP: 88/66  HR:   RR:  SpO2: % on   Temp:  FS: 100    Vital Signs Last 24 Hrs  T(C): 36.9 (15 Aug 2018 12:25), Max: 37.3 (15 Aug 2018 00:34)  T(F): 98.5 (15 Aug 2018 12:25), Max: 99.2 (15 Aug 2018 05:32)  HR: 83 (15 Aug 2018 12:25) (48 - 85)  BP: 125/89 (15 Aug 2018 12:25) (109/75 - 138/88)  BP(mean): --  RR: 18 (15 Aug 2018 12:25) (12 - 18)  SpO2: 99% (15 Aug 2018 12:25) (97% - 100%)    Physical Exam:  General: Well developed, well nourished, NAD  HEENT: NCAT, PERRLA, EOMI bl, moist mucous membranes   Neck: Supple, nontender, no mass  Neurology: A&Ox3, nonfocal, CN II-XII grossly intact, sensation intact, no gait abnormalities   Respiratory: CTA B/L, No W/R/R  CV: RRR, +S1/S2, no murmurs, rubs or gallops  Abdominal: Soft, NT, ND +BSx4  Extremities: No C/C/E, + peripheral pulses  MSK: Normal ROM, no joint erythema or warmth, no joint swelling   Skin: warm, dry, normal color, no rash or abnormal lesions      Assessment/Plan:    -Patient given 500 NS bolus to infuse over one hour  -Will continue to follow, RN to call if any changes. Resident Rapid Response Note    Rapid response was called at 3:38 for seizure.     Patient was seen and examined at the bedside by the rapid response team and resident medicine team and ICU at bedside. Pt was witnessed to have contracted upper extremities, nonresponsive to verbal stimuli.    Rapid Response Vital Signs:  BP: 88/66  HR: 87  RR: 16  SpO2: 97% on RA  Temp: 98.6 F  FS: 100    Vital Signs Last 24 Hrs  T(C): 36.9 (15 Aug 2018 12:25), Max: 37.3 (15 Aug 2018 00:34)  T(F): 98.5 (15 Aug 2018 12:25), Max: 99.2 (15 Aug 2018 05:32)  HR: 83 (15 Aug 2018 12:25) (48 - 85)  BP: 125/89 (15 Aug 2018 12:25) (109/75 - 138/88)  BP(mean): --  RR: 18 (15 Aug 2018 12:25) (12 - 18)  SpO2: 99% (15 Aug 2018 12:25) (97% - 100%)    Physical Exam:  General: PT in apparent distress  HEENT: NCAT, PERRLA, EOMI bl, moist mucous membranes   Neck: Supple, nontender, no mass  Neurology: unable to assess, pt nonresponsive to verbal stimuli  Respiratory: CTA B/L, No W/R/R  CV: RRR, +S1/S2, no murmurs, rubs or gallops  Abdominal: Soft, NT, ND +BSx4  Extremities: No C/C/E, + peripheral pulses  MSK: contracted UE B/L         Assessment/Plan: Pt is a 43-year-old with history of substance abuse and multiple seizures with status epilepticus called for RRT for seizure.    - when assessing motor function, pt gripped hand tighter. When assessing eyes, pt started looking downwards. No deviations noted.   - Ativan ordered. As Ativan was being drawn, IV was flushed w/ 5cc NS to assess patency. IV was then flushed again to ascertain patency. Within 40 seconds, pt became more responsive, opened eyes and responded to verbal stimuli.  - Ativan held, as it was not needed. Pt alert and oriented.  - likely pseudo-seizure, drugseeking behavior, given hx of substance abuse.  - Patient given 500 NS bolus to infuse over one hour for borderline low BP.  - continue to monitor    -Will continue to follow, RN to call if any changes.

## 2018-08-15 NOTE — CHART NOTE - NSCHARTNOTEFT_GEN_A_CORE
Patient is a 43y old  Female who presents with a chief complaint of seizures (15 Aug 2018 16:49)      Rapid response was called at 23:29 on this 43yFemale patient for seizure.     Patient was seen and examined at the bedside by the rapid response team, Dr. Reyes and ICU PA. Patient actively shaking with eyes closed. patient seized for approximately 4 minutes.     Rapid Response Vitals:  BP: 110/60  HR: 103  T: 98. 3  RR: 16   SpO2: 99%  FS: 99      PHYSICAL EXAM:  GENERAL: seizing   HEAD:  Atraumatic, Normocephalic  EYES: upon manually opening eyes, eyes moving downward   NERVOUS SYSTEM:  tensing bilateral upper extremities, not responsive to painful stimuli    CHEST/LUNG: Clear to auscultation bilaterally; No rales, rhonchi, wheezing, or rubs  HEART: Regular rate and rhythm; No murmurs, rubs, or gallops  ABDOMEN: Soft, Nontender, Nondistended  EXTREMITIES: clenching upper extremities   SKIN: No rashes or lesions        ASSESSMENT AND PLAN    Pt is a 43-year-old with history of substance abuse and multiple seizures. Rapid response earlier for likely pseudoseizures, patient with history of alcohol/benzo abuse.  As Ativan was being drawn, IV was flushed w/ 5cc NS to assess patency. IV was then flushed for patency. Seizing resolved within 1 minute before Ativan was given  - Dr. Reyes at bedside, aware of plan, agrees  - RN to call if change in status. Patient is a 43y old  Female who presents with a chief complaint of seizures (15 Aug 2018 16:49)      Rapid response was called at 23:29 on this 43yFemale patient for seizure.     Patient was seen and examined at the bedside by the rapid response team, Dr. Reyes and ICU PA. Patient actively shaking all four extremities with eyes closed. Patient appeared to have seizure-like activity for approximately 4 minutes.     Rapid Response Vitals:  BP: 110/60  HR: 103  T: 98. 3  RR: 16   SpO2: 99%  FS: 99      PHYSICAL EXAM:  GENERAL: seizing   HEAD:  Atraumatic, Normocephalic  EYES: upon manually opening eyes, eyes moving downward   NERVOUS SYSTEM:  jerome bilateral upper extremities, not responsive to painful stimuli    CHEST/LUNG: Clear to auscultation bilaterally; No rales, rhonchi, wheezing, or rubs  HEART: Regular rate and rhythm; No murmurs, rubs, or gallops  ABDOMEN: Soft, Nontender, Nondistended  EXTREMITIES: clenching upper extremities   SKIN: No rashes or lesions        ASSESSMENT AND PLAN    Pt is a 43-year-old with history of substance abuse and multiple seizures. Rapid response earlier for likely pseudoseizures, patient with history of alcohol/benzo abuse.  As Ativan was being drawn, IV was flushed w/ 5cc NS to assess patency. IV was then flushed for patency. Seizing resolved within 1 minute before Ativan was given  - Dr. Reyes at bedside, aware of plan, agrees  - ICU PA at bedside  - RN to call if change in status.

## 2018-08-16 ENCOUNTER — INPATIENT (INPATIENT)
Facility: HOSPITAL | Age: 43
LOS: 3 days | Discharge: HOME | DRG: 880 | End: 2018-08-20
Attending: PSYCHIATRY & NEUROLOGY | Admitting: PSYCHIATRY & NEUROLOGY
Payer: MEDICARE

## 2018-08-16 VITALS
TEMPERATURE: 99 F | HEART RATE: 78 BPM | RESPIRATION RATE: 118 BRPM | DIASTOLIC BLOOD PRESSURE: 79 MMHG | OXYGEN SATURATION: 100 % | SYSTOLIC BLOOD PRESSURE: 111 MMHG

## 2018-08-16 VITALS
HEART RATE: 90 BPM | SYSTOLIC BLOOD PRESSURE: 122 MMHG | DIASTOLIC BLOOD PRESSURE: 83 MMHG | RESPIRATION RATE: 18 BRPM | TEMPERATURE: 98 F | OXYGEN SATURATION: 94 %

## 2018-08-16 DIAGNOSIS — G40.89 OTHER SEIZURES: ICD-10-CM

## 2018-08-16 LAB
ALBUMIN SERPL ELPH-MCNC: 3.6 G/DL — SIGNIFICANT CHANGE UP (ref 3.3–5)
ALP SERPL-CCNC: 40 U/L — SIGNIFICANT CHANGE UP (ref 40–120)
ALT FLD-CCNC: 105 U/L — HIGH (ref 12–78)
ANION GAP SERPL CALC-SCNC: 7 MMOL/L — SIGNIFICANT CHANGE UP (ref 5–17)
AST SERPL-CCNC: 36 U/L — SIGNIFICANT CHANGE UP (ref 15–37)
BILIRUB SERPL-MCNC: 0.3 MG/DL — SIGNIFICANT CHANGE UP (ref 0.2–1.2)
BUN SERPL-MCNC: 10 MG/DL — SIGNIFICANT CHANGE UP (ref 7–23)
CALCIUM SERPL-MCNC: 8.7 MG/DL — SIGNIFICANT CHANGE UP (ref 8.5–10.1)
CHLORIDE SERPL-SCNC: 104 MMOL/L — SIGNIFICANT CHANGE UP (ref 96–108)
CHOLEST SERPL-MCNC: 240 MG/DL — HIGH (ref 10–199)
CO2 SERPL-SCNC: 30 MMOL/L — SIGNIFICANT CHANGE UP (ref 22–31)
CREAT SERPL-MCNC: 0.88 MG/DL — SIGNIFICANT CHANGE UP (ref 0.5–1.3)
GLUCOSE SERPL-MCNC: 81 MG/DL — SIGNIFICANT CHANGE UP (ref 70–99)
HCT VFR BLD CALC: 38.2 % — SIGNIFICANT CHANGE UP (ref 34.5–45)
HDLC SERPL-MCNC: 62 MG/DL — SIGNIFICANT CHANGE UP
HGB BLD-MCNC: 13.4 G/DL — SIGNIFICANT CHANGE UP (ref 11.5–15.5)
LAMOTRIGINE SERPL-MCNC: 3.5 MCG/ML — SIGNIFICANT CHANGE UP (ref 2.5–15)
LEVETIRACETAM SERPL-MCNC: 29.4 MCG/ML — SIGNIFICANT CHANGE UP (ref 12–46)
LIPID PNL WITH DIRECT LDL SERPL: 168 MG/DL — HIGH
MAGNESIUM SERPL-MCNC: 2.4 MG/DL — SIGNIFICANT CHANGE UP (ref 1.6–2.6)
MCHC RBC-ENTMCNC: 31.2 PG — SIGNIFICANT CHANGE UP (ref 27–34)
MCHC RBC-ENTMCNC: 35.1 GM/DL — SIGNIFICANT CHANGE UP (ref 32–36)
MCV RBC AUTO: 88.8 FL — SIGNIFICANT CHANGE UP (ref 80–100)
NRBC # BLD: 0 /100 WBCS — SIGNIFICANT CHANGE UP (ref 0–0)
PLATELET # BLD AUTO: 272 K/UL — SIGNIFICANT CHANGE UP (ref 150–400)
POTASSIUM SERPL-MCNC: 4 MMOL/L — SIGNIFICANT CHANGE UP (ref 3.5–5.3)
POTASSIUM SERPL-SCNC: 4 MMOL/L — SIGNIFICANT CHANGE UP (ref 3.5–5.3)
PROT SERPL-MCNC: 6.9 G/DL — SIGNIFICANT CHANGE UP (ref 6–8.3)
RBC # BLD: 4.3 M/UL — SIGNIFICANT CHANGE UP (ref 3.8–5.2)
RBC # FLD: 12.1 % — SIGNIFICANT CHANGE UP (ref 10.3–14.5)
SODIUM SERPL-SCNC: 141 MMOL/L — SIGNIFICANT CHANGE UP (ref 135–145)
TOTAL CHOLESTEROL/HDL RATIO MEASUREMENT: 3.9 RATIO — SIGNIFICANT CHANGE UP (ref 3.3–7.1)
TRIGL SERPL-MCNC: 48 MG/DL — SIGNIFICANT CHANGE UP (ref 10–149)
WBC # BLD: 7.89 K/UL — SIGNIFICANT CHANGE UP (ref 3.8–10.5)
WBC # FLD AUTO: 7.89 K/UL — SIGNIFICANT CHANGE UP (ref 3.8–10.5)

## 2018-08-16 PROCEDURE — 80175 DRUG SCREEN QUAN LAMOTRIGINE: CPT

## 2018-08-16 PROCEDURE — 85610 PROTHROMBIN TIME: CPT

## 2018-08-16 PROCEDURE — 70450 CT HEAD/BRAIN W/O DYE: CPT

## 2018-08-16 PROCEDURE — 83735 ASSAY OF MAGNESIUM: CPT

## 2018-08-16 PROCEDURE — 96375 TX/PRO/DX INJ NEW DRUG ADDON: CPT

## 2018-08-16 PROCEDURE — 95819 EEG AWAKE AND ASLEEP: CPT | Mod: 26

## 2018-08-16 PROCEDURE — 96374 THER/PROPH/DIAG INJ IV PUSH: CPT

## 2018-08-16 PROCEDURE — 99285 EMERGENCY DEPT VISIT HI MDM: CPT | Mod: 25

## 2018-08-16 PROCEDURE — 82962 GLUCOSE BLOOD TEST: CPT

## 2018-08-16 PROCEDURE — 80053 COMPREHEN METABOLIC PANEL: CPT

## 2018-08-16 PROCEDURE — 96372 THER/PROPH/DIAG INJ SC/IM: CPT | Mod: XU

## 2018-08-16 PROCEDURE — 82803 BLOOD GASES ANY COMBINATION: CPT

## 2018-08-16 PROCEDURE — 80061 LIPID PANEL: CPT

## 2018-08-16 PROCEDURE — 95951: CPT | Mod: 26

## 2018-08-16 PROCEDURE — 85027 COMPLETE CBC AUTOMATED: CPT

## 2018-08-16 PROCEDURE — 84702 CHORIONIC GONADOTROPIN TEST: CPT

## 2018-08-16 PROCEDURE — 99239 HOSP IP/OBS DSCHRG MGMT >30: CPT

## 2018-08-16 PROCEDURE — 85730 THROMBOPLASTIN TIME PARTIAL: CPT

## 2018-08-16 PROCEDURE — 93005 ELECTROCARDIOGRAM TRACING: CPT

## 2018-08-16 PROCEDURE — 80177 DRUG SCRN QUAN LEVETIRACETAM: CPT

## 2018-08-16 PROCEDURE — 95816 EEG AWAKE AND DROWSY: CPT

## 2018-08-16 PROCEDURE — 96376 TX/PRO/DX INJ SAME DRUG ADON: CPT

## 2018-08-16 RX ORDER — TRAZODONE HCL 50 MG
50 TABLET ORAL AT BEDTIME
Qty: 0 | Refills: 0 | Status: DISCONTINUED | OUTPATIENT
Start: 2018-08-16 | End: 2018-08-20

## 2018-08-16 RX ORDER — FOLIC ACID 0.8 MG
1 TABLET ORAL DAILY
Qty: 0 | Refills: 0 | Status: DISCONTINUED | OUTPATIENT
Start: 2018-08-16 | End: 2018-08-20

## 2018-08-16 RX ORDER — ALPRAZOLAM 0.25 MG
1 TABLET ORAL
Qty: 0 | Refills: 0 | COMMUNITY

## 2018-08-16 RX ORDER — HYDROXYZINE HCL 10 MG
25 TABLET ORAL EVERY 8 HOURS
Qty: 0 | Refills: 0 | Status: DISCONTINUED | OUTPATIENT
Start: 2018-08-16 | End: 2018-08-20

## 2018-08-16 RX ORDER — LEVETIRACETAM 250 MG/1
1500 TABLET, FILM COATED ORAL
Qty: 0 | Refills: 0 | Status: DISCONTINUED | OUTPATIENT
Start: 2018-08-16 | End: 2018-08-18

## 2018-08-16 RX ORDER — NICOTINE POLACRILEX 2 MG
2 GUM BUCCAL
Qty: 0 | Refills: 0 | Status: DISCONTINUED | OUTPATIENT
Start: 2018-08-16 | End: 2018-08-20

## 2018-08-16 RX ORDER — LAMOTRIGINE 25 MG/1
100 TABLET, ORALLY DISINTEGRATING ORAL
Qty: 0 | Refills: 0 | Status: DISCONTINUED | OUTPATIENT
Start: 2018-08-16 | End: 2018-08-18

## 2018-08-16 RX ORDER — LACOSAMIDE 50 MG/1
200 TABLET ORAL
Qty: 0 | Refills: 0 | Status: DISCONTINUED | OUTPATIENT
Start: 2018-08-16 | End: 2018-08-20

## 2018-08-16 RX ORDER — LAMOTRIGINE 25 MG/1
5 TABLET, ORALLY DISINTEGRATING ORAL
Qty: 0 | Refills: 0 | COMMUNITY
Start: 2018-08-16

## 2018-08-16 RX ORDER — MAGNESIUM HYDROXIDE 400 MG/1
30 TABLET, CHEWABLE ORAL AT BEDTIME
Qty: 0 | Refills: 0 | Status: DISCONTINUED | OUTPATIENT
Start: 2018-08-16 | End: 2018-08-20

## 2018-08-16 RX ORDER — NICOTINE POLACRILEX 2 MG
1 GUM BUCCAL DAILY
Qty: 0 | Refills: 0 | Status: DISCONTINUED | OUTPATIENT
Start: 2018-08-16 | End: 2018-08-20

## 2018-08-16 RX ORDER — CITALOPRAM 10 MG/1
40 TABLET, FILM COATED ORAL DAILY
Qty: 0 | Refills: 0 | Status: DISCONTINUED | OUTPATIENT
Start: 2018-08-16 | End: 2018-08-20

## 2018-08-16 RX ORDER — ACETAMINOPHEN 500 MG
650 TABLET ORAL EVERY 6 HOURS
Qty: 0 | Refills: 0 | Status: DISCONTINUED | OUTPATIENT
Start: 2018-08-16 | End: 2018-08-20

## 2018-08-16 RX ORDER — LEVETIRACETAM 250 MG/1
1 TABLET, FILM COATED ORAL
Qty: 0 | Refills: 0 | COMMUNITY

## 2018-08-16 RX ORDER — CELECOXIB 200 MG/1
1 CAPSULE ORAL
Qty: 0 | Refills: 0 | COMMUNITY

## 2018-08-16 RX ORDER — CELECOXIB 200 MG/1
200 CAPSULE ORAL
Qty: 0 | Refills: 0 | Status: DISCONTINUED | OUTPATIENT
Start: 2018-08-16 | End: 2018-08-20

## 2018-08-16 RX ORDER — ALPRAZOLAM 0.25 MG
0.5 TABLET ORAL
Qty: 0 | Refills: 0 | Status: DISCONTINUED | OUTPATIENT
Start: 2018-08-16 | End: 2018-08-16

## 2018-08-16 RX ORDER — THIAMINE MONONITRATE (VIT B1) 100 MG
100 TABLET ORAL DAILY
Qty: 0 | Refills: 0 | Status: DISCONTINUED | OUTPATIENT
Start: 2018-08-16 | End: 2018-08-20

## 2018-08-16 RX ORDER — LAMOTRIGINE 25 MG/1
4 TABLET, ORALLY DISINTEGRATING ORAL
Qty: 0 | Refills: 0 | COMMUNITY
Start: 2018-08-16

## 2018-08-16 RX ORDER — ENOXAPARIN SODIUM 100 MG/ML
40 INJECTION SUBCUTANEOUS EVERY 24 HOURS
Qty: 0 | Refills: 0 | Status: DISCONTINUED | OUTPATIENT
Start: 2018-08-16 | End: 2018-08-20

## 2018-08-16 RX ORDER — LAMOTRIGINE 25 MG/1
1 TABLET, ORALLY DISINTEGRATING ORAL
Qty: 0 | Refills: 0 | COMMUNITY

## 2018-08-16 RX ADMIN — CITALOPRAM 40 MILLIGRAM(S): 10 TABLET, FILM COATED ORAL at 11:41

## 2018-08-16 RX ADMIN — LAMOTRIGINE 100 MILLIGRAM(S): 25 TABLET, ORALLY DISINTEGRATING ORAL at 20:47

## 2018-08-16 RX ADMIN — CELECOXIB 200 MILLIGRAM(S): 200 CAPSULE ORAL at 09:25

## 2018-08-16 RX ADMIN — Medication 4 MILLIGRAM(S): at 12:40

## 2018-08-16 RX ADMIN — LACOSAMIDE 200 MILLIGRAM(S): 50 TABLET ORAL at 06:54

## 2018-08-16 RX ADMIN — Medication 1 MILLIGRAM(S): at 11:41

## 2018-08-16 RX ADMIN — ENOXAPARIN SODIUM 40 MILLIGRAM(S): 100 INJECTION SUBCUTANEOUS at 20:46

## 2018-08-16 RX ADMIN — Medication 1 PATCH: at 11:41

## 2018-08-16 RX ADMIN — Medication 1 TABLET(S): at 11:41

## 2018-08-16 RX ADMIN — CELECOXIB 200 MILLIGRAM(S): 200 CAPSULE ORAL at 21:17

## 2018-08-16 RX ADMIN — LACOSAMIDE 200 MILLIGRAM(S): 50 TABLET ORAL at 20:46

## 2018-08-16 RX ADMIN — LAMOTRIGINE 125 MILLIGRAM(S): 25 TABLET, ORALLY DISINTEGRATING ORAL at 06:54

## 2018-08-16 RX ADMIN — CELECOXIB 200 MILLIGRAM(S): 200 CAPSULE ORAL at 09:55

## 2018-08-16 RX ADMIN — LEVETIRACETAM 400 MILLIGRAM(S): 250 TABLET, FILM COATED ORAL at 06:53

## 2018-08-16 RX ADMIN — CELECOXIB 200 MILLIGRAM(S): 200 CAPSULE ORAL at 20:47

## 2018-08-16 RX ADMIN — Medication 2 MILLIGRAM(S): at 09:20

## 2018-08-16 RX ADMIN — Medication 100 MILLIGRAM(S): at 11:41

## 2018-08-16 NOTE — H&P ADULT - NSHPSOCIALHISTORY_GEN_ALL_CORE
Pt has hx of smoking, ETOH (drinks 3 beers a day), benzo, opiate (up to 240mg /day that was obtained illegally) abuse.

## 2018-08-16 NOTE — CHART NOTE - NSCHARTNOTEFT_GEN_A_CORE
Resident Rapid Response Note/Follow Up    Rapid response was called at 1:52 for seizure.      Patient was seen and examined at the bedside by the rapid response team and resident medicine team.  ICU PA and Dr. Weir at bedside.     Rapid Response Vital Signs:  BP:  HR:  RR:  SpO2: % on   Temp:  FS:    Vital Signs Last 24 Hrs  T(C): 36.8 (16 Aug 2018 07:30), Max: 37.6 (15 Aug 2018 23:24)  T(F): 98.3 (16 Aug 2018 07:30), Max: 99.6 (15 Aug 2018 23:24)  HR: 66 (16 Aug 2018 07:30) (66 - 85)  BP: 123/81 (16 Aug 2018 07:30) (107/74 - 124/85)  BP(mean): --  RR: 18 (16 Aug 2018 07:30) (18 - 18)  SpO2: 100% (16 Aug 2018 07:30) (98% - 100%)    Physical Exam:  General: Shaking  HEENT: NCAT  Neurology: unable to assess as agitated  Cardiac: RRR, S1, S2, no murmurs, rubs or gallops   Resp: CTAB    Assessment/Plan:  Pt is a 43-year-old with history of substance abuse and multiple seizures. Multiple rapid responses called throughout yesterday, overnight and today for agitation, patient with history of alcohol/benzo abuse, s/p fall witnessed by brother with rapid called for agitation.     -Dr. Andres attending made aware  -Patient pending transfer to [place] for Video EEG.   -Continue on 1:1, bedrest, bed alarm and fall risk precautions.   -Will continue to follow, RN to call if any changes.    Dr. Grullon PGY-1 [phone]. Resident Rapid Response Note/Follow Up    Rapid response was called at 1:52 for seizure.    Patient was seen and examined at the bedside by the rapid response team and resident medicine team.  ICU PA and Dr. Weir at bedside.     Rapid Response Vital Signs:  BP: 146/86  HR: 98   RR: 20  SpO2: 97 % on 2L NC   Temp: 98.8  FS: 92    Vital Signs Last 24 Hrs  T(C): 36.8 (16 Aug 2018 07:30), Max: 37.6 (15 Aug 2018 23:24)  T(F): 98.3 (16 Aug 2018 07:30), Max: 99.6 (15 Aug 2018 23:24)  HR: 66 (16 Aug 2018 07:30) (66 - 85)  BP: 123/81 (16 Aug 2018 07:30) (107/74 - 124/85)  BP(mean): --  RR: 18 (16 Aug 2018 07:30) (18 - 18)  SpO2: 100% (16 Aug 2018 07:30) (98% - 100%)    Physical Exam:  General: Shaking  HEENT: NCAT  Neurology: unable to assess as agitated  Cardiac: RRR, S1, S2, no murmurs, rubs or gallops   Resp: CTAB    Assessment/Plan:  Pt is a 43-year-old with history of substance abuse and multiple seizures. Multiple rapid responses called throughout yesterday, overnight and today for agitation, patient with history of alcohol/benzo abuse, s/p fall witnessed by brother 2 hours prior with rapid called for questionable seizure/agitation.     -IV flush given to assess patency, IV flushed again to ascertain patency, patient's symptoms started to resolve.   -Dr. Andres attending made aware  -Dr. Weir made aware.  -Patient pending transfer to [place] for Video EEG.   -Continue on 1:1, bedrest, bed alarm and fall risk precautions.   -Will continue to follow, RN to call if any changes.    Dr. Grullon PGY-1 [phone].

## 2018-08-16 NOTE — CHART NOTE - NSCHARTNOTEFT_GEN_A_CORE
Resident Rapid Response Note    Rapid response was called at 9:08 for seizure.    Patient was seen and examined at the bedside by the rapid response team and resident medicine team. ICU PA at bedside. Patient shaking and crying. Episode lasted approximately 5 minutes with no postictal state.     Rapid Response Vital Signs:  BP: 107/88  HR: 118  RR: 18  SpO2: 96 % on 4L NC   Temp:  FS: 90    Repeat vitals:   BP:121/70  HR: 89  RR: 18  SpO2: 99 % on 4L NC   Temp:  FS:       Vital Signs Last 24 Hrs  T(C): 36.8 (16 Aug 2018 07:30), Max: 37.6 (15 Aug 2018 23:24)  T(F): 98.3 (16 Aug 2018 07:30), Max: 99.6 (15 Aug 2018 23:24)  HR: 66 (16 Aug 2018 07:30) (66 - 85)  BP: 123/81 (16 Aug 2018 07:30) (107/74 - 125/89)  BP(mean): --  RR: 18 (16 Aug 2018 07:30) (18 - 18)  SpO2: 100% (16 Aug 2018 07:30) (98% - 100%)    Physical Exam:  General: Shaking  HEENT: NCAT  Neurology: unable to assess as agitated  Cardiac: RRR, S1, S2, no murmers, rubs or gallops   Resp: CTAB      Assessment/Plan:  Pt is a 43-year-old with history of substance abuse and multiple seizures. Multiple rapid responses called throughout yesterday and overnight for agitation, patient with history of alcohol/benzo abuse.  - Plan to be transferred to [place] for video EEG  - 2mg Ativan IV push given  - Attending Dr. Andres notified  - Will continue to follow, RN to call if any changes.    Dr. Grullon PGY1 [id number]

## 2018-08-16 NOTE — H&P ADULT - HISTORY OF PRESENT ILLNESS
Pt admitted as a transfer From [facility].     Pt is a 44 y/o woman with hx of epilepsy, anxiety, alcohol benzo and opiate abuse. She was admitted to [facility] 8/3/18 for alcohol, benzo and opioid detox. Her last reported seizure-like event was July 24. She also has hx of weekly blackouts. She lives in [place], [place] but was here visiting her brother. She was recently discharged from detox on 7/31 but relapsed the day after. She did have any substances while admitted to [facility]. While there pt had episode of seizure-like event and was sent to [facility]. She had more in the ED. While admitted she had another event where she fell to the floor while here family was there. She was then placed on constant observation and had no further events. Today while in transport to [facility] she had two events in the Ambulance and one in the hallway of the floor. During these events her head shakes back and forth, her arms curl up to her chest, sometimes she can respond to questions. She had another two events soon after being admitted. There was no eye deviation, she flinched to painful stimuli, and her arms can be moved. They resolved without any medication.

## 2018-08-16 NOTE — CHART NOTE - NSCHARTNOTEFT_GEN_A_CORE
Resident Rapid Response Note/2 hour followup    Rapid response was called at 11:11 for seizure.     Patient was seen and examined at the bedside by the rapid response team and resident medicine team. ICU PA at bedside. Patient agitated and shaking.     Rapid Response Vital Signs:  BP: 120/80  HR: 99  RR: 22  SpO2: 99% on 2L NC  Temp: 98.1  FS: 98    REpeat  BP: 115/72  HR:74  RR: 20  Sp02 - 97% on RA    Vital Signs Last 24 Hrs  T(C): 36.8 (16 Aug 2018 07:30), Max: 37.6 (15 Aug 2018 23:24)  T(F): 98.3 (16 Aug 2018 07:30), Max: 99.6 (15 Aug 2018 23:24)  HR: 66 (16 Aug 2018 07:30) (66 - 85)  BP: 123/81 (16 Aug 2018 07:30) (107/74 - 125/89)  BP(mean): --  RR: 18 (16 Aug 2018 07:30) (18 - 18)  SpO2: 100% (16 Aug 2018 07:30) (98% - 100%)    Physical Exam:  General: Shaking  HEENT: NCAT  Neurology: unable to assess as agitated  Cardiac: RRR, S1, S2, no murmers, rubs or gallops   Resp: CTAB    Assessment/Plan:  - IV flush given to assess patency, IV flushed again to ascertain patency, patient's symptoms started to resolve.   - Plan to establish additional IV access  - Plan to transfer to [place] for video EEG.  - Dr. Andres attending aware  - Family at bedside.  - Will continue to follow, RN to call if any changes.    Dr. Grullon [id number] PGY1 Resident Rapid Response Note/2 hour followup    Rapid response was called at 11:11 for seizure.     Patient was seen and examined at the bedside by the rapid response team and resident medicine team. ICU PA at bedside. Patient agitated and shaking.     Rapid Response Vital Signs:  BP: 120/80  HR: 99  RR: 22  SpO2: 99% on 2L NC  Temp: 98.1  FS: 98    REpeat  BP: 115/72  HR:74  RR: 20  Sp02 - 97% on RA    Vital Signs Last 24 Hrs  T(C): 36.8 (16 Aug 2018 07:30), Max: 37.6 (15 Aug 2018 23:24)  T(F): 98.3 (16 Aug 2018 07:30), Max: 99.6 (15 Aug 2018 23:24)  HR: 66 (16 Aug 2018 07:30) (66 - 85)  BP: 123/81 (16 Aug 2018 07:30) (107/74 - 125/89)  BP(mean): --  RR: 18 (16 Aug 2018 07:30) (18 - 18)  SpO2: 100% (16 Aug 2018 07:30) (98% - 100%)    Physical Exam:  General: Shaking  HEENT: NCAT  Neurology: unable to assess as agitated  Cardiac: RRR, S1, S2, no murmers, rubs or gallops   Resp: CTAB    Assessment/Plan:  Pt is a 43-year-old with history of substance abuse and multiple seizures. Multiple rapid responses called throughout yesterday and overnight for agitation, patient with history of alcohol/benzo abuse.    - IV flush given to assess patency, IV flushed again to ascertain patency, patient's symptoms started to resolve.   - Plan to establish additional IV access  - Plan to transfer to [place] for video EEG.  - Dr. Andres attending aware  - Family at bedside.  - Will continue to follow, RN to call if any changes.    Dr. Grullon [id number] PGY1
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
## 2018-08-16 NOTE — CHART NOTE - NSCHARTNOTEFT_GEN_A_CORE
Rapid Response follow-up     Patient sleeping comfortably in bed.  Spoke with nurse patient has not any further seizing episodes.     Vital Signs Last 24 Hrs  T(C): 37.6 (15 Aug 2018 23:24), Max: 37.6 (15 Aug 2018 23:24)  T(F): 99.6 (15 Aug 2018 23:24), Max: 99.6 (15 Aug 2018 23:24)  HR: 76 (15 Aug 2018 23:24) (63 - 85)  BP: 123/83 (15 Aug 2018 23:24) (109/75 - 125/89)  BP(mean): --  RR: 18 (15 Aug 2018 23:24) (12 - 18)  SpO2: 98% (15 Aug 2018 23:24) (98% - 100%) Rapid Response follow-up     Patient sleeping comfortably in bed.  Spoke with nurse patient has not any further seizing episodes.     Vital Signs Last 24 Hrs  T(C): 37.6 (15 Aug 2018 23:24), Max: 37.6 (15 Aug 2018 23:24)  T(F): 99.6 (15 Aug 2018 23:24), Max: 99.6 (15 Aug 2018 23:24)  HR: 76 (15 Aug 2018 23:24) (63 - 85)  BP: 123/83 (15 Aug 2018 23:24) (109/75 - 125/89)  BP(mean): --  RR: 18 (15 Aug 2018 23:24) (12 - 18)  SpO2: 98% (15 Aug 2018 23:24) (98% - 100%)    ASSESSMENT AND PLAN    Pt is a 43-year-old with history of substance abuse and multiple seizures. Rapid response earlier for likely pseudoseizures, patient with history of alcohol/benzo abuse.    RN to call if change in status.  Dr. Reyes contacted [facility], plan to contact epilepsy team [facility] in AM.  Discussed with Dr. Reyes, agrees with plan Rapid Response follow-up     Patient sleeping comfortably in bed.  Spoke with nurse patient has not any further seizure-like episodes.     Vital Signs Last 24 Hrs  T(C): 37.6 (15 Aug 2018 23:24), Max: 37.6 (15 Aug 2018 23:24)  T(F): 99.6 (15 Aug 2018 23:24), Max: 99.6 (15 Aug 2018 23:24)  HR: 76 (15 Aug 2018 23:24) (63 - 85)  BP: 123/83 (15 Aug 2018 23:24) (109/75 - 125/89)  BP(mean): --  RR: 18 (15 Aug 2018 23:24) (12 - 18)  SpO2: 98% (15 Aug 2018 23:24) (98% - 100%)    ASSESSMENT AND PLAN    Pt is a 43-year-old with history of substance abuse and multiple seizures. Rapid response earlier for likely pseudoseizures, patient with history of alcohol/benzo abuse.  The patient was found to be sleeping in bed. No seizure-like activity witnessed since RR.  RN to call if change in status.  Dr. Reyes contacted [facility], plan to contact epilepsy team [facility] in AM.  Discussed with Dr. Reyes, agrees with plan

## 2018-08-16 NOTE — H&P ADULT - NSHPPHYSICALEXAM_GEN_ALL_CORE
Vital Signs Last 24 Hrs  T(C): 36.9 (16 Aug 2018 19:08), Max: 37.6 (15 Aug 2018 23:24)  T(F): 98.5 (16 Aug 2018 19:08), Max: 99.6 (15 Aug 2018 23:24)  HR: 79 (16 Aug 2018 19:08) (66 - 85)  BP: 131/79 (16 Aug 2018 19:08) (107/74 - 131/79)  BP(mean): --  RR: 18 (16 Aug 2018 19:08) (18 - 18)  SpO2: 97% (16 Aug 2018 19:08) (97% - 100%)    Gen: NAD  MS: alert oriented speech fluent, follows commands occasionally, pt not fully cooperative  CN- PERRL, EOMI, VFF, face symmetric,   Motor: GOSS, 5/5 throughout  Sens- intact to painful stim and light touch  Coor- unable to assess  gait- unable to assess

## 2018-08-16 NOTE — CHART NOTE - NSCHARTNOTEFT_GEN_A_CORE
Resident Rapid Response Note    Rapid response was called at 12:30 for seizure.    Patient was seen and examined at the bedside by the rapid response team and resident medicine team.  ICU PA at bedside. As per family at bedside patient was seizing and fell out of bed.    Rapid Response Vital Signs:  BP:  HR:  RR:  SpO2: % on   Temp:  FS:    Vital Signs Last 24 Hrs  T(C): 36.8 (16 Aug 2018 07:30), Max: 37.6 (15 Aug 2018 23:24)  T(F): 98.3 (16 Aug 2018 07:30), Max: 99.6 (15 Aug 2018 23:24)  HR: 66 (16 Aug 2018 07:30) (66 - 85)  BP: 123/81 (16 Aug 2018 07:30) (107/74 - 124/85)  BP(mean): --  RR: 18 (16 Aug 2018 07:30) (18 - 18)  SpO2: 100% (16 Aug 2018 07:30) (98% - 100%)    Physical Exam:  General: Shaking  HEENT: NCAT  Neurology: unable to assess as agitated  Cardiac: RRR, S1, S2, no murmers, rubs or gallops   Resp: CTAB    Assessment/Plan:  Pt is a 43-year-old with history of substance abuse and multiple seizures. Multiple rapid responses called throughout yesterday and overnight for agitation, patient with history of alcohol/benzo abuse.    -4 mg IV ativan ordered    -Will continue to follow, RN to call if any changes. Resident Rapid Response Note/Follow Up    Rapid response was called at 12:30 for seizure.    Patient was seen and examined at the bedside by the rapid response team and resident medicine team.  ICU PA at bedside. As per family at bedside patient was seizing and fell out of bed.     Rapid Response Vital Signs:  BP: 113/68  HR: 99  RR: 20  SpO2:97 % on 2L  Temp:  FS:    Vital Signs Last 24 Hrs  T(C): 36.8 (16 Aug 2018 07:30), Max: 37.6 (15 Aug 2018 23:24)  T(F): 98.3 (16 Aug 2018 07:30), Max: 99.6 (15 Aug 2018 23:24)  HR: 66 (16 Aug 2018 07:30) (66 - 85)  BP: 123/81 (16 Aug 2018 07:30) (107/74 - 124/85)  BP(mean): --  RR: 18 (16 Aug 2018 07:30) (18 - 18)  SpO2: 100% (16 Aug 2018 07:30) (98% - 100%)    Physical Exam:  General: Shaking  HEENT: NCAT  Neurology: unable to assess as agitated  Cardiac: RRR, S1, S2, no murmurs, rubs or gallops   Resp: CTAB    Assessment/Plan:  Pt is a 43-year-old with history of substance abuse and multiple seizures. Multiple rapid responses called throughout yesterday and overnight for agitation, patient with history of alcohol/benzo abuse, now with fall, witnessed by brother - patient did not hit head.     -4 mg IV ativan given  -Dr. Andres attending made aware  -Patient pending transfer to [place] for Video EEG.   -Patient placed on 1:1, bedrest, bed alarm and fall risk precautions.   -Will continue to follow, RN to call if any changes.    Dr. Grullon PGY-1 [phone] Resident Rapid Response Note/Follow Up    Rapid response was called at 12:30 for seizure.    Patient was seen and examined at the bedside by the rapid response team and resident medicine team.  ICU PA at bedside. As per family at bedside patient was seizing and fell out of bed.     Rapid Response Vital Signs:  BP: 113/68  HR: 99  RR: 20  SpO2:97 % on 2L  Temp: 98.6  FS:    Vital Signs Last 24 Hrs  T(C): 36.8 (16 Aug 2018 07:30), Max: 37.6 (15 Aug 2018 23:24)  T(F): 98.3 (16 Aug 2018 07:30), Max: 99.6 (15 Aug 2018 23:24)  HR: 66 (16 Aug 2018 07:30) (66 - 85)  BP: 123/81 (16 Aug 2018 07:30) (107/74 - 124/85)  BP(mean): --  RR: 18 (16 Aug 2018 07:30) (18 - 18)  SpO2: 100% (16 Aug 2018 07:30) (98% - 100%)    Physical Exam:  General: Shaking  HEENT: NCAT  Neurology: unable to assess as agitated  Cardiac: RRR, S1, S2, no murmurs, rubs or gallops   Resp: CTAB    Assessment/Plan:  Pt is a 43-year-old with history of substance abuse and multiple seizures. Multiple rapid responses called throughout yesterday and overnight for agitation, patient with history of alcohol/benzo abuse, now with fall, witnessed by brother - patient did not hit head.     -4 mg IV ativan given  -Dr. Andres attending made aware  -Patient pending transfer to [place] for Video EEG.   -Patient placed on 1:1, bedrest, bed alarm and fall risk precautions.   -Will continue to follow, RN to call if any changes.    Dr. Grullon PGY-1 [phone]

## 2018-08-16 NOTE — PROGRESS NOTE ADULT - SUBJECTIVE AND OBJECTIVE BOX
Neurology follow up note    JOHN [id number]      Interval History:    Patient feels ok no new complaints.    MEDICATIONS    celecoxib 200 milliGRAM(s) Oral two times a day with meals  citalopram 40 milliGRAM(s) Oral daily  cloNIDine 0.1 milliGRAM(s) Oral every 4 hours PRN  folic acid 1 milliGRAM(s) Oral daily  hydrOXYzine hydrochloride 25 milliGRAM(s) Oral every 8 hours PRN  lacosamide 200 milliGRAM(s) Oral two times a day  lamoTRIgine 125 milliGRAM(s) Oral two times a day  levETIRAcetam  IVPB 1500 milliGRAM(s) IV Intermittent every 12 hours  LORazepam   Injectable 2 milliGRAM(s) IV Push every 6 hours PRN  multivitamin 1 Tablet(s) Oral daily  nicotine  Polacrilex Gum 2 milliGRAM(s) Oral every 1 hour PRN  nicotine -   7 mG/24Hr(s) Patch 1 patch Transdermal daily  thiamine 100 milliGRAM(s) Oral daily      Allergies    No Known Allergies    Intolerances            Vital Signs Last 24 Hrs  T(C): 36.8 (16 Aug 2018 07:30), Max: 37.6 (15 Aug 2018 23:24)  T(F): 98.3 (16 Aug 2018 07:30), Max: 99.6 (15 Aug 2018 23:24)  HR: 66 (16 Aug 2018 07:30) (66 - 85)  BP: 123/81 (16 Aug 2018 07:30) (107/74 - 125/89)  BP(mean): --  RR: 18 (16 Aug 2018 07:30) (18 - 18)  SpO2: 100% (16 Aug 2018 07:30) (98% - 100%)      REVIEW OF SYSTEMS:     Constitutional: No fever, chills, fatigue, weakness  Eyes: no eye pain, visual disturbances, or discharge  ENT:  No difficulty hearing, tinnitus, vertigo; No sinus or throat pain  Neck: No pain or stiffness  Respiratory: No cough, dyspnea, wheezing   Cardiovascular: No chest pain, palpitations,   Gastrointestinal: No abdominal or epigastric pain. No nausea, vomiting  No diarrhea or constipation.   Genitourinary: No dysuria, frequency, hematuria or incontinence  Neurological: No headaches, lightheadedness, vertigo, numbness or tremors  Psychiatric: No depression, anxiety, mood swings or difficulty sleeping  Musculoskeletal: No joint pain or swelling; No muscle, back or extremity pain  Skin: No itching, burning, rashes or lesions   Lymph Nodes: No enlarged glands  Endocrine: No heat or cold intolerance; No hair loss   Allergy and Immunologic: No hives or eczema    On Neurological Examination:    Mental Status - Patient is alert, awake, oriented X3.       Follow simple commands  Follow complex commands    Speech -   Fluent                      Cranial Nerves - Pupils 3 mm equal and reactive to light,   extraocular eye movements intact.   smile symmetric  intact bilateral NLF    Motor Exam -   Right upper 5/5  Left upper 5/5  Right lower 5/5  Left lower 5/5    Muscle tone - is normal all over.  No asymmetry is seen.      Sensory    Bilateral intact to light touch    GENERAL Exam: Nontoxic , No Acute Distress   	  HEENT:  normocephalic, atraumatic  		  LUNGS: Clear bilaterally    	  HEART: Normal S1S2   No murmur RRR        	  GI/ ABDOMEN:  Soft  Non tender    EXTREMITIES:   No Edema  No Clubbing  No Cyanosis     MUSCULOSKELETAL: Normal Range of Motion  	   SKIN: Normal  No Ecchymosis               LABS:  CBC Full  -  ( 16 Aug 2018 06:39 )  WBC Count : 7.89 K/uL  Hemoglobin : 13.4 g/dL  Hematocrit : 38.2 %  Platelet Count - Automated : 272 K/uL  Mean Cell Volume : 88.8 fl  Mean Cell Hemoglobin : 31.2 pg  Mean Cell Hemoglobin Concentration : 35.1 gm/dL  Auto Neutrophil # : x  Auto Lymphocyte # : x  Auto Monocyte # : x  Auto Eosinophil # : x  Auto Basophil # : x  Auto Neutrophil % : x  Auto Lymphocyte % : x  Auto Monocyte % : x  Auto Eosinophil % : x  Auto Basophil % : x      08-16    141  |  104  |  10  ----------------------------<  81  4.0   |  30  |  0.88    Ca    8.7      16 Aug 2018 06:39  Mg     2.4     08-16    TPro  6.9  /  Alb  3.6  /  TBili  0.3  /  DBili  x   /  AST  36  /  ALT  105<H>  /  AlkPhos  40  08-16    Hemoglobin A1C:   Lipid Panel 08-16 @ 07:41  Total Cholesterol, Serum 240    Triglycerides 48    LIVER FUNCTIONS - ( 16 Aug 2018 06:39 )  Alb: 3.6 g/dL / Pro: 6.9 g/dL / ALK PHOS: 40 U/L / ALT: 105 U/L / AST: 36 U/L / GGT: x           Vitamin B12   PT/INR - ( 14 Aug 2018 15:49 )   PT: 11.3 sec;   INR: 1.04 ratio         PTT - ( 14 Aug 2018 15:49 )  PTT:29.4 sec      RADIOLOGY    ANALYSIS AND PLAN:  This is a 43-year-old with history of substance abuse and now multiple seizures status epilepticus.  1.	For episode of status epilepticus, unclear what the patient was abusing, it appears that at one point the patient was on Xanax from what I could ascertain from family was on excessive amount.  It appeared that the patient was detoxified in the past at [facility].   2.	 Questionable breakthrough seizures from underlying epilepsy.  In light of the patient had recurrent seizures,  adjustments were made to her  AED medications.  3.	I would recommend seizure precautions.  4.	Monitor the patient's electrolytes.  5.	EEG was normal  6.	limit ativan do to history of xanax abuse   7.	spoke to spouse to mail  magnet for VNS   8.	would recommend detoxification program.       now patient had multiple events yesterday  and today -- at times resolving only with NS flush given -- possible psychogenic seizures -- will plan for transfer for video EEG monitoring -- spoke to multiple family members in detail       Greater than 65 minutes spent in direct patient care reviewing  the notes, lab data/ imaging , discussion with multidisciplinary team.

## 2018-08-17 PROBLEM — F41.9 ANXIETY DISORDER, UNSPECIFIED: Chronic | Status: ACTIVE | Noted: 2018-08-14

## 2018-08-17 PROBLEM — F10.10 ALCOHOL ABUSE, UNCOMPLICATED: Chronic | Status: ACTIVE | Noted: 2018-08-14

## 2018-08-17 PROBLEM — R56.9 UNSPECIFIED CONVULSIONS: Chronic | Status: ACTIVE | Noted: 2018-08-14

## 2018-08-17 PROBLEM — F12.10 CANNABIS ABUSE, UNCOMPLICATED: Chronic | Status: ACTIVE | Noted: 2018-08-14

## 2018-08-17 PROBLEM — F13.20 SEDATIVE, HYPNOTIC OR ANXIOLYTIC DEPENDENCE, UNCOMPLICATED: Chronic | Status: ACTIVE | Noted: 2018-08-14

## 2018-08-17 LAB
ALBUMIN SERPL ELPH-MCNC: 4.2 G/DL — SIGNIFICANT CHANGE UP (ref 3.3–5)
ALP SERPL-CCNC: 40 U/L — SIGNIFICANT CHANGE UP (ref 40–120)
ALT FLD-CCNC: 66 U/L — HIGH (ref 10–45)
ANION GAP SERPL CALC-SCNC: 14 MMOL/L — SIGNIFICANT CHANGE UP (ref 5–17)
ANION GAP SERPL CALC-SCNC: 15 MMOL/L — SIGNIFICANT CHANGE UP (ref 5–17)
AST SERPL-CCNC: 27 U/L — SIGNIFICANT CHANGE UP (ref 10–40)
BILIRUB DIRECT SERPL-MCNC: <0.1 MG/DL — SIGNIFICANT CHANGE UP (ref 0–0.2)
BILIRUB INDIRECT FLD-MCNC: >0.3 MG/DL — SIGNIFICANT CHANGE UP (ref 0.2–1)
BILIRUB SERPL-MCNC: 0.4 MG/DL — SIGNIFICANT CHANGE UP (ref 0.2–1.2)
BUN SERPL-MCNC: 10 MG/DL — SIGNIFICANT CHANGE UP (ref 7–23)
BUN SERPL-MCNC: 10 MG/DL — SIGNIFICANT CHANGE UP (ref 7–23)
CALCIUM SERPL-MCNC: 9 MG/DL — SIGNIFICANT CHANGE UP (ref 8.4–10.5)
CALCIUM SERPL-MCNC: 9 MG/DL — SIGNIFICANT CHANGE UP (ref 8.4–10.5)
CHLORIDE SERPL-SCNC: 98 MMOL/L — SIGNIFICANT CHANGE UP (ref 96–108)
CHLORIDE SERPL-SCNC: 99 MMOL/L — SIGNIFICANT CHANGE UP (ref 96–108)
CO2 SERPL-SCNC: 23 MMOL/L — SIGNIFICANT CHANGE UP (ref 22–31)
CO2 SERPL-SCNC: 24 MMOL/L — SIGNIFICANT CHANGE UP (ref 22–31)
CREAT SERPL-MCNC: 0.86 MG/DL — SIGNIFICANT CHANGE UP (ref 0.5–1.3)
CREAT SERPL-MCNC: 0.93 MG/DL — SIGNIFICANT CHANGE UP (ref 0.5–1.3)
GLUCOSE SERPL-MCNC: 115 MG/DL — HIGH (ref 70–99)
GLUCOSE SERPL-MCNC: 93 MG/DL — SIGNIFICANT CHANGE UP (ref 70–99)
HCG SERPL-ACNC: <2 MIU/ML — SIGNIFICANT CHANGE UP
HCT VFR BLD CALC: 39.5 % — SIGNIFICANT CHANGE UP (ref 34.5–45)
HGB BLD-MCNC: 13.7 G/DL — SIGNIFICANT CHANGE UP (ref 11.5–15.5)
MAGNESIUM SERPL-MCNC: 2.1 MG/DL — SIGNIFICANT CHANGE UP (ref 1.6–2.6)
MAGNESIUM SERPL-MCNC: 2.1 MG/DL — SIGNIFICANT CHANGE UP (ref 1.6–2.6)
MCHC RBC-ENTMCNC: 30.9 PG — SIGNIFICANT CHANGE UP (ref 27–34)
MCHC RBC-ENTMCNC: 34.7 GM/DL — SIGNIFICANT CHANGE UP (ref 32–36)
MCV RBC AUTO: 89 FL — SIGNIFICANT CHANGE UP (ref 80–100)
PHOSPHATE SERPL-MCNC: 4.3 MG/DL — SIGNIFICANT CHANGE UP (ref 2.5–4.5)
PHOSPHATE SERPL-MCNC: 5.5 MG/DL — HIGH (ref 2.5–4.5)
PLATELET # BLD AUTO: 277 K/UL — SIGNIFICANT CHANGE UP (ref 150–400)
POTASSIUM SERPL-MCNC: 3.9 MMOL/L — SIGNIFICANT CHANGE UP (ref 3.5–5.3)
POTASSIUM SERPL-MCNC: 4.1 MMOL/L — SIGNIFICANT CHANGE UP (ref 3.5–5.3)
POTASSIUM SERPL-SCNC: 3.9 MMOL/L — SIGNIFICANT CHANGE UP (ref 3.5–5.3)
POTASSIUM SERPL-SCNC: 4.1 MMOL/L — SIGNIFICANT CHANGE UP (ref 3.5–5.3)
PROT SERPL-MCNC: 7.1 G/DL — SIGNIFICANT CHANGE UP (ref 6–8.3)
RBC # BLD: 4.44 M/UL — SIGNIFICANT CHANGE UP (ref 3.8–5.2)
RBC # FLD: 12.5 % — SIGNIFICANT CHANGE UP (ref 10.3–14.5)
SODIUM SERPL-SCNC: 136 MMOL/L — SIGNIFICANT CHANGE UP (ref 135–145)
SODIUM SERPL-SCNC: 137 MMOL/L — SIGNIFICANT CHANGE UP (ref 135–145)
WBC # BLD: 7.91 K/UL — SIGNIFICANT CHANGE UP (ref 3.8–10.5)
WBC # FLD AUTO: 7.91 K/UL — SIGNIFICANT CHANGE UP (ref 3.8–10.5)

## 2018-08-17 PROCEDURE — 99223 1ST HOSP IP/OBS HIGH 75: CPT | Mod: GC

## 2018-08-17 PROCEDURE — 95951: CPT | Mod: 26

## 2018-08-17 RX ADMIN — CELECOXIB 200 MILLIGRAM(S): 200 CAPSULE ORAL at 05:02

## 2018-08-17 RX ADMIN — LACOSAMIDE 200 MILLIGRAM(S): 50 TABLET ORAL at 05:02

## 2018-08-17 RX ADMIN — Medication 100 MILLIGRAM(S): at 11:20

## 2018-08-17 RX ADMIN — Medication 1 TABLET(S): at 11:20

## 2018-08-17 RX ADMIN — Medication 1 PATCH: at 11:20

## 2018-08-17 RX ADMIN — CITALOPRAM 40 MILLIGRAM(S): 10 TABLET, FILM COATED ORAL at 11:20

## 2018-08-17 RX ADMIN — CELECOXIB 200 MILLIGRAM(S): 200 CAPSULE ORAL at 05:32

## 2018-08-17 RX ADMIN — LACOSAMIDE 200 MILLIGRAM(S): 50 TABLET ORAL at 17:24

## 2018-08-17 RX ADMIN — ENOXAPARIN SODIUM 40 MILLIGRAM(S): 100 INJECTION SUBCUTANEOUS at 21:52

## 2018-08-17 RX ADMIN — LAMOTRIGINE 100 MILLIGRAM(S): 25 TABLET, ORALLY DISINTEGRATING ORAL at 17:20

## 2018-08-17 RX ADMIN — LEVETIRACETAM 1500 MILLIGRAM(S): 250 TABLET, FILM COATED ORAL at 17:12

## 2018-08-17 RX ADMIN — LAMOTRIGINE 100 MILLIGRAM(S): 25 TABLET, ORALLY DISINTEGRATING ORAL at 05:02

## 2018-08-17 RX ADMIN — Medication 1 MILLIGRAM(S): at 11:20

## 2018-08-17 RX ADMIN — CELECOXIB 200 MILLIGRAM(S): 200 CAPSULE ORAL at 17:20

## 2018-08-17 RX ADMIN — LEVETIRACETAM 1500 MILLIGRAM(S): 250 TABLET, FILM COATED ORAL at 05:03

## 2018-08-17 NOTE — PROVIDER CONTACT NOTE (OTHER) - SITUATION
Patient seizure-like activity at this time, full body stiffness, right arm raising and lowering, hands opening and closing, torso bridging upwards.  Not speaking at this time, lasting 6 minutes.
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
## 2018-08-17 NOTE — PROVIDER CONTACT NOTE (OTHER) - ASSESSMENT
74hr 170/72bp 18rr 98%spo2 on nonrebreather mask  -  07:01  118HR 140/89bp 18rr 100%spo2 on nonrebreather mask  -  07:03  85HR 118/77bp 18rr 99%spo2 on room air - 07:11

## 2018-08-17 NOTE — PROVIDER CONTACT NOTE (OTHER) - ACTION/TREATMENT ORDERED:
No further intervention at this time, continue to monitor.  Seizure monitoring and precautions will be maintained.

## 2018-08-18 PROCEDURE — 95951: CPT | Mod: 26

## 2018-08-18 PROCEDURE — 99233 SBSQ HOSP IP/OBS HIGH 50: CPT

## 2018-08-18 RX ORDER — LEVETIRACETAM 250 MG/1
750 TABLET, FILM COATED ORAL
Qty: 0 | Refills: 0 | Status: COMPLETED | OUTPATIENT
Start: 2018-08-18 | End: 2018-08-19

## 2018-08-18 RX ORDER — LAMOTRIGINE 25 MG/1
100 TABLET, ORALLY DISINTEGRATING ORAL
Qty: 0 | Refills: 0 | Status: COMPLETED | OUTPATIENT
Start: 2018-08-18 | End: 2018-08-19

## 2018-08-18 RX ORDER — LAMOTRIGINE 25 MG/1
150 TABLET, ORALLY DISINTEGRATING ORAL
Qty: 0 | Refills: 0 | Status: DISCONTINUED | OUTPATIENT
Start: 2018-08-19 | End: 2018-08-20

## 2018-08-18 RX ADMIN — LEVETIRACETAM 1500 MILLIGRAM(S): 250 TABLET, FILM COATED ORAL at 05:51

## 2018-08-18 RX ADMIN — LEVETIRACETAM 750 MILLIGRAM(S): 250 TABLET, FILM COATED ORAL at 21:02

## 2018-08-18 RX ADMIN — LACOSAMIDE 200 MILLIGRAM(S): 50 TABLET ORAL at 05:51

## 2018-08-18 RX ADMIN — CELECOXIB 200 MILLIGRAM(S): 200 CAPSULE ORAL at 18:22

## 2018-08-18 RX ADMIN — Medication 1 PATCH: at 13:48

## 2018-08-18 RX ADMIN — Medication 1 TABLET(S): at 13:49

## 2018-08-18 RX ADMIN — CITALOPRAM 40 MILLIGRAM(S): 10 TABLET, FILM COATED ORAL at 12:46

## 2018-08-18 RX ADMIN — ENOXAPARIN SODIUM 40 MILLIGRAM(S): 100 INJECTION SUBCUTANEOUS at 21:02

## 2018-08-18 RX ADMIN — Medication 1 PATCH: at 12:46

## 2018-08-18 RX ADMIN — LAMOTRIGINE 100 MILLIGRAM(S): 25 TABLET, ORALLY DISINTEGRATING ORAL at 21:02

## 2018-08-18 RX ADMIN — LAMOTRIGINE 100 MILLIGRAM(S): 25 TABLET, ORALLY DISINTEGRATING ORAL at 05:51

## 2018-08-18 RX ADMIN — Medication 1 MILLIGRAM(S): at 12:46

## 2018-08-18 RX ADMIN — CELECOXIB 200 MILLIGRAM(S): 200 CAPSULE ORAL at 05:51

## 2018-08-18 RX ADMIN — Medication 100 MILLIGRAM(S): at 13:49

## 2018-08-18 RX ADMIN — CELECOXIB 200 MILLIGRAM(S): 200 CAPSULE ORAL at 17:35

## 2018-08-18 RX ADMIN — LACOSAMIDE 200 MILLIGRAM(S): 50 TABLET ORAL at 17:35
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
## 2018-08-18 NOTE — PROGRESS NOTE ADULT - SUBJECTIVE AND OBJECTIVE BOX
Neurology Progress Note    Interval history - No epileptiform correlate with clinical seizure like episode.    Vital Signs Last 24 Hrs  T(C): 36.7 (18 Aug 2018 07:49), Max: 37.3 (17 Aug 2018 15:19)  T(F): 98 (18 Aug 2018 07:49), Max: 99.1 (17 Aug 2018 15:19)  HR: 82 (18 Aug 2018 10:49) (70 - 88)  BP: 127/78 (18 Aug 2018 10:49) (102/66 - 127/78)  BP(mean): --  RR: 19 (18 Aug 2018 10:49) (18 - 22)  SpO2: 99% (18 Aug 2018 10:49) (98% - 100%)    Neurological Exam:  Gen: NAD  MS: alert oriented speech fluent, follows commands occasionally, pt  not fully cooperative  CN- PERRL, EOMI, VFF, face symmetric,   Motor: GOSS, 5/5 throughout  Sens- intact to painful stim and light touch  Coordination - pt not cooperative with exam     Labs                        13.7   7.91  )-----------( 277      ( 17 Aug 2018 05:54 )             39.5   08-17    136  |  98  |  10  ----------------------------<  115<H>  4.1   |  24  |  0.93    Ca    9.0      17 Aug 2018 11:34  Phos  4.3     08-17  Mg     2.1     08-17    TPro  7.1  /  Alb  4.2  /  TBili  0.4  /  DBili  <0.1  /  AST  27  /  ALT  66<H>  /  AlkPhos  40  08-17  LIVER FUNCTIONS - ( 17 Aug 2018 05:03 )  Alb: 4.2 g/dL / Pro: 7.1 g/dL / ALK PHOS: 40 U/L / ALT: 66 U/L / AST: 27 U/L / GGT: x

## 2018-08-19 LAB
LAMOTRIGINE SERPL-MCNC: 3.3 MCG/ML — SIGNIFICANT CHANGE UP (ref 2.5–15)
LEVETIRACETAM SERPL-MCNC: 10.8 MCG/ML — LOW (ref 12–46)

## 2018-08-19 PROCEDURE — 95951: CPT | Mod: 26

## 2018-08-19 PROCEDURE — 99232 SBSQ HOSP IP/OBS MODERATE 35: CPT

## 2018-08-19 RX ADMIN — CELECOXIB 200 MILLIGRAM(S): 200 CAPSULE ORAL at 18:22

## 2018-08-19 RX ADMIN — LACOSAMIDE 200 MILLIGRAM(S): 50 TABLET ORAL at 18:22

## 2018-08-19 RX ADMIN — LACOSAMIDE 200 MILLIGRAM(S): 50 TABLET ORAL at 05:56

## 2018-08-19 RX ADMIN — Medication 1 MILLIGRAM(S): at 12:06

## 2018-08-19 RX ADMIN — Medication 1 TABLET(S): at 12:07

## 2018-08-19 RX ADMIN — ENOXAPARIN SODIUM 40 MILLIGRAM(S): 100 INJECTION SUBCUTANEOUS at 19:48

## 2018-08-19 RX ADMIN — Medication 100 MILLIGRAM(S): at 12:06

## 2018-08-19 RX ADMIN — CITALOPRAM 40 MILLIGRAM(S): 10 TABLET, FILM COATED ORAL at 12:06

## 2018-08-19 RX ADMIN — CELECOXIB 200 MILLIGRAM(S): 200 CAPSULE ORAL at 05:56

## 2018-08-19 RX ADMIN — LEVETIRACETAM 750 MILLIGRAM(S): 250 TABLET, FILM COATED ORAL at 05:57

## 2018-08-19 RX ADMIN — Medication 1 PATCH: at 11:58

## 2018-08-19 RX ADMIN — LAMOTRIGINE 100 MILLIGRAM(S): 25 TABLET, ORALLY DISINTEGRATING ORAL at 05:57

## 2018-08-19 RX ADMIN — Medication 1 PATCH: at 12:07

## 2018-08-19 NOTE — PROGRESS NOTE ADULT - ATTENDING COMMENTS
Possible mixed disorder but currently more acute problem being the PNES. Now off Keppra and increased LMT. Pending input from psychiatry and SW for addiction specialist in [place]. Last non-epileptic episode was yesterday AM, non since. EEG normal throughout. The patient states she feels very well, better than yesterday.  -plan to talk to patient's neurologist on monday  -f/p SW and psychiatry consult

## 2018-08-19 NOTE — PROGRESS NOTE ADULT - ASSESSMENT
41 y/o woman with hx of seizure, anxiety, substance abuse recently in detox and rehab, now here due to multiple seizure like events. These events are likely non-epileptic, unknown if pt has underlying organic epilepsy.    EEG shows non-epileptic events and interictal EEG is normal. One prolonged episode(~15min) occurred during rounds yesterday. She responded to reassurance and gradually event subsided.   However, review of history raises concern for epileptic seizure, which could occur either as alcohol/benzo withdrawal or from epilepsy. Pt recalls being told at some point that she had L temporal abnormality that could cause seizures.     On exam this morning, pt seen while having non-epileptic event lasting 15-20 min.    Neuro exam o/w non-focal.    Plan  1. psychiatry input pending for non-epileptic events and h/o substance problems. The patient would be interested in an addiction specialist in [place]. Social work is looking into options.   2. c/w Lacosamide 200 BID  3. Increased Lamictal to 150 BID tonight since we will taper off Keppra  4. Keppra 750mg once in the AM then will discontinue  5. Rescue medication: lorazepam 2mg IV prn after any seizure > 5min, 2 or more seizure in 1 hour, 3 or more seizures in 24h, or after any tonic-clonic seizure.   6. Rescue IV AED - Keppra IV 1500mg prn accelerating epileptic seizures.  6. seizure, DVT and fall precautions.

## 2018-08-19 NOTE — PROGRESS NOTE ADULT - SUBJECTIVE AND OBJECTIVE BOX
Subjective: Interval History - No events overnight    Objective:   Vital Signs Last 24 Hrs  T(C): 36.7 (19 Aug 2018 10:09), Max: 37.1 (18 Aug 2018 15:29)  T(F): 98 (19 Aug 2018 10:09), Max: 98.7 (18 Aug 2018 15:29)  HR: 87 (19 Aug 2018 10:09) (67 - 87)  BP: 120/76 (19 Aug 2018 10:09) (107/66 - 127/78)  RR: 18 (19 Aug 2018 10:09) (18 - 19)  SpO2: 99% (19 Aug 2018 10:09) (99% - 100%)    General Exam:   General appearance: No acute distress                   Neurological Exam:  MS: alert oriented speech fluent, follows commands occasionally, pt  not fully cooperative  CN- PERRL, EOMI, VFF, face symmetric,   Motor: GOSS, 5/5 throughout  Sens- intact to painful stim and light touch  Coordination - pt not cooperative with exam     Other:    08-17    136  |  98  |  10  ----------------------------<  115<H>  4.1   |  24  |  0.93    Ca    9.0      17 Aug 2018 11:34  Phos  4.3     08-17  Mg     2.1     08-17    MEDICATIONS  (STANDING):  celecoxib 200 milliGRAM(s) Oral two times a day  citalopram 40 milliGRAM(s) Oral daily  enoxaparin Injectable 40 milliGRAM(s) SubCutaneous every 24 hours  folic acid 1 milliGRAM(s) Oral daily  lacosamide 200 milliGRAM(s) Oral two times a day  lamoTRIgine 150 milliGRAM(s) Oral two times a day  multivitamin 1 Tablet(s) Oral daily  nicotine -   7 mG/24Hr(s) Patch 1 patch Transdermal daily  thiamine 100 milliGRAM(s) Oral daily    MEDICATIONS  (PRN):  acetaminophen   Tablet. 650 milliGRAM(s) Oral every 6 hours PRN mild- moderate pain  bisacodyl 5 milliGRAM(s) Oral at bedtime PRN Constipation  chlordiazePOXIDE 50 milliGRAM(s) Oral every 4 hours PRN Symptom-triggered: each CIWA -Ar score 8 or GREATER  cloNIDine 0.1 milliGRAM(s) Oral every 4 hours PRN breakthrough withdrawal  hydrOXYzine hydrochloride 25 milliGRAM(s) Oral every 8 hours PRN breakthrough withdrawal  magnesium hydroxide Suspension 30 milliLiter(s) Oral at bedtime PRN Constipation  nicotine  Polacrilex Gum 2 milliGRAM(s) Oral every 2 hours PRN cravings  traZODone 50 milliGRAM(s) Oral at bedtime PRN insomnia

## 2018-08-20 ENCOUNTER — TRANSCRIPTION ENCOUNTER (OUTPATIENT)
Age: 43
End: 2018-08-20

## 2018-08-20 VITALS
DIASTOLIC BLOOD PRESSURE: 73 MMHG | HEART RATE: 81 BPM | TEMPERATURE: 98 F | RESPIRATION RATE: 17 BRPM | OXYGEN SATURATION: 98 % | SYSTOLIC BLOOD PRESSURE: 113 MMHG

## 2018-08-20 DIAGNOSIS — F32.9 MAJOR DEPRESSIVE DISORDER, SINGLE EPISODE, UNSPECIFIED: ICD-10-CM

## 2018-08-20 DIAGNOSIS — F19.10 OTHER PSYCHOACTIVE SUBSTANCE ABUSE, UNCOMPLICATED: ICD-10-CM

## 2018-08-20 PROCEDURE — 99222 1ST HOSP IP/OBS MODERATE 55: CPT | Mod: GC

## 2018-08-20 PROCEDURE — 80177 DRUG SCRN QUAN LEVETIRACETAM: CPT

## 2018-08-20 PROCEDURE — 80048 BASIC METABOLIC PNL TOTAL CA: CPT

## 2018-08-20 PROCEDURE — 84100 ASSAY OF PHOSPHORUS: CPT

## 2018-08-20 PROCEDURE — 95819 EEG AWAKE AND ASLEEP: CPT

## 2018-08-20 PROCEDURE — 80175 DRUG SCREEN QUAN LAMOTRIGINE: CPT

## 2018-08-20 PROCEDURE — 99238 HOSP IP/OBS DSCHRG MGMT 30/<: CPT

## 2018-08-20 PROCEDURE — 95951: CPT | Mod: 26,52

## 2018-08-20 PROCEDURE — 82248 BILIRUBIN DIRECT: CPT

## 2018-08-20 PROCEDURE — 85027 COMPLETE CBC AUTOMATED: CPT

## 2018-08-20 PROCEDURE — 80053 COMPREHEN METABOLIC PANEL: CPT

## 2018-08-20 PROCEDURE — 83735 ASSAY OF MAGNESIUM: CPT

## 2018-08-20 PROCEDURE — 95951: CPT

## 2018-08-20 PROCEDURE — 84702 CHORIONIC GONADOTROPIN TEST: CPT

## 2018-08-20 RX ORDER — LAMOTRIGINE 25 MG/1
1 TABLET, ORALLY DISINTEGRATING ORAL
Qty: 60 | Refills: 0 | OUTPATIENT
Start: 2018-08-20

## 2018-08-20 RX ORDER — LACOSAMIDE 50 MG/1
4 TABLET ORAL
Qty: 0 | Refills: 0 | COMMUNITY

## 2018-08-20 RX ORDER — THIAMINE MONONITRATE (VIT B1) 100 MG
1 TABLET ORAL
Qty: 0 | Refills: 0 | COMMUNITY
Start: 2018-08-20

## 2018-08-20 RX ORDER — LEVETIRACETAM 250 MG/1
3 TABLET, FILM COATED ORAL
Qty: 0 | Refills: 0 | COMMUNITY

## 2018-08-20 RX ORDER — LACOSAMIDE 50 MG/1
1 TABLET ORAL
Qty: 60 | Refills: 0 | OUTPATIENT
Start: 2018-08-20

## 2018-08-20 RX ORDER — THIAMINE MONONITRATE (VIT B1) 100 MG
1 TABLET ORAL
Qty: 0 | Refills: 0 | COMMUNITY

## 2018-08-20 RX ADMIN — LAMOTRIGINE 150 MILLIGRAM(S): 25 TABLET, ORALLY DISINTEGRATING ORAL at 05:02

## 2018-08-20 RX ADMIN — Medication 1 TABLET(S): at 11:09

## 2018-08-20 RX ADMIN — CITALOPRAM 40 MILLIGRAM(S): 10 TABLET, FILM COATED ORAL at 11:10

## 2018-08-20 RX ADMIN — Medication 100 MILLIGRAM(S): at 11:09

## 2018-08-20 RX ADMIN — LACOSAMIDE 200 MILLIGRAM(S): 50 TABLET ORAL at 16:56

## 2018-08-20 RX ADMIN — CELECOXIB 200 MILLIGRAM(S): 200 CAPSULE ORAL at 05:02

## 2018-08-20 RX ADMIN — LAMOTRIGINE 150 MILLIGRAM(S): 25 TABLET, ORALLY DISINTEGRATING ORAL at 16:56

## 2018-08-20 RX ADMIN — LACOSAMIDE 200 MILLIGRAM(S): 50 TABLET ORAL at 05:00

## 2018-08-20 RX ADMIN — Medication 1 PATCH: at 11:10

## 2018-08-20 RX ADMIN — Medication 1 PATCH: at 11:12

## 2018-08-20 RX ADMIN — CELECOXIB 200 MILLIGRAM(S): 200 CAPSULE ORAL at 16:56

## 2018-08-20 RX ADMIN — Medication 1 MILLIGRAM(S): at 11:09
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
## 2018-08-20 NOTE — BEHAVIORAL HEALTH ASSESSMENT NOTE - SUMMARY
This is a 42 y/o , , disabled, domiciled with  and 2 children F with a past psych hx of anxiety , depression, and polysubstance abuse (with alcohol, opioids and benzodiazepines) on celexa 40 mg qd (never seen by outpatient psychiatry, no inpatient psychiatric hospitalizations) and past medical hx significant for epilepsy admitted for seizures with psychiatry consulted for polysubstance abuse, anxiety, and depression. Pt was receiving treatment for polysubstance abuse with detox and rehab prior to hospitalization and demonstrates strong desire to continue rehab, either as outpatient or inpatient. She has been set up with appropriate resources by [facility] and exhibits good insight into her condition. Pt will also benefit from outpatient therapy for anxiety and depression. Pt has been on celexa 40 mg qd for a few years for anxiety and depression. Pt may benefit from seeing outpatient psychiatrist to optimize med management, however as pt is being discharged today, no changes to medication should be made. Pt counseled on her addiction and given supportive therapy. This is a 42 y/o , , disabled, domiciled with  and 2 children F with a past psych hx of anxiety , depression, and polysubstance abuse (with alcohol, opioids and benzodiazepines) on celexa 40 mg qd (never seen by outpatient psychiatry, no inpatient psychiatric hospitalizations) and past medical hx significant for epilepsy admitted for seizures with psychiatry consulted for polysubstance abuse, anxiety, and depression. Pt was receiving treatment for polysubstance abuse with detox and rehab prior to hospitalization and demonstrates strong desire to continue rehab, either as outpatient or inpatient. She has been set up with appropriate resources by [facility] and exhibits good insight into her condition. Pt will also benefit from outpatient therapy for anxiety and depression. Pt has been on celexa 40 mg qd for a few years for anxiety and depression. Pt may benefit from seeing outpatient psychiatrist to optimize med management. Pt counseled on her addiction and supportive interventions were provided

## 2018-08-20 NOTE — DISCHARGE NOTE ADULT - PATIENT PORTAL LINK FT
You can access the [facility] Patient Portal, offered by [facility], by registering with the following website: [email or web address]

## 2018-08-20 NOTE — DISCHARGE NOTE ADULT - MEDICATION SUMMARY - MEDICATIONS TO STOP TAKING
I will STOP taking the medications listed below when I get home from the hospital:    Keppra 500 mg oral tablet  -- 3 tab(s) by mouth 2 times a day

## 2018-08-20 NOTE — DISCHARGE NOTE ADULT - PLAN OF CARE
Symptom Improvement Please follow up with your primary medical doctor within one month of discharge from the hospital.  Please follow up with your neurologist within one month of discharge from the hospital.  Please follow up with your psychiatrist within one month of discharge from the hospital.  Please continue to take your medications as prescribed by your doctor.

## 2018-08-20 NOTE — DISCHARGE NOTE ADULT - CARE PLAN
Principal Discharge DX:	Psychogenic nonepileptic seizure  Goal:	Symptom Improvement  Assessment and plan of treatment:	Please follow up with your primary medical doctor within one month of discharge from the hospital.  Please follow up with your neurologist within one month of discharge from the hospital.  Please follow up with your psychiatrist within one month of discharge from the hospital.  Please continue to take your medications as prescribed by your doctor.

## 2018-08-20 NOTE — DISCHARGE NOTE ADULT - CARE PROVIDER_API CALL
Neil Clark (MD), Clinical Neurophysiology; EEGEpilepsy; Neurology  [address] [address]  Phone: [phone]  Fax: [phone] Neil Clark), Clinical Neurophysiology; EEGEpilepsy; Neurology  [address] [address]  Phone: [phone]  Fax: [phone]    [facility],   Phone: [phone]  Fax: (       -

## 2018-08-20 NOTE — DISCHARGE NOTE ADULT - CARE PROVIDERS DIRECT ADDRESSES
,[email or web address],DirectAddress_Unknown
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
## 2018-08-20 NOTE — BEHAVIORAL HEALTH ASSESSMENT NOTE - NSBHCHARTREVIEWVS_PSY_A_CORE FT
Vital Signs (24 Hrs):  T(C): 36.6 (08-20-18 @ 11:45), Max: 36.9 (08-19-18 @ 19:54)  HR: 76 (08-20-18 @ 11:45) (70 - 84)  BP: 111/74 (08-20-18 @ 11:45) (105/72 - 120/75)  RR: 18 (08-20-18 @ 11:45) (16 - 18)  SpO2: 99% (08-20-18 @ 11:45) (97% - 100%)  Wt(kg): --  Daily     Daily     I&O's Summary    19 Aug 2018 07:01  -  20 Aug 2018 07:00  --------------------------------------------------------  IN: 489 mL / OUT: 0 mL / NET: 489 mL    20 Aug 2018 07:01  -  20 Aug 2018 15:00  --------------------------------------------------------  IN: 1000 mL / OUT: 2 mL / NET: 998 mL

## 2018-08-20 NOTE — BEHAVIORAL HEALTH ASSESSMENT NOTE - DESCRIPTION (FIRST USE, LAST USE, QUANTITY, FREQUENCY, DURATION)
0.5 ppd for 25 years 4-6 beers per day with some days of binging on hard liquor; last used in late July smoked THC as teenager snorted cocaine for 4 months in 1999 80 mg oxycodone BID; at first prescribed oxycodone and dilaudid for pain in 2009, then no longer prescribed so was purchasing from a friend for past 3 years taking 2 mg Xanax qHS (prescribed as .5 mg BID PRN by neurologist for anxiety)

## 2018-08-20 NOTE — DISCHARGE NOTE ADULT - MEDICATION SUMMARY - MEDICATIONS TO TAKE
I will START or STAY ON the medications listed below when I get home from the hospital:    Mobic 15 mg oral tablet  -- 1 tab(s) by mouth once a day  -- Indication: For Pain    celecoxib 200 mg oral capsule  -- 1 cap(s) by mouth 2 times a day with meals  -- Indication: For Pain    Milk of Magnesia 8% oral suspension  -- 30 milliliter(s) by mouth once a day (at bedtime), As Needed for constipation  -- Indication: For GERD    Mylanta oral suspension  -- 30 milliliter(s) by mouth 4 times a day (after meals and at bedtime), As Needed  -- Indication: For GERD    Catapres 0.1 mg oral tablet  -- 1 tab(s) by mouth every 4 hours, As Needed for breathrough withdrawl   -- Indication: For HTN    lacosamide 200 mg oral tablet  -- 1 tab(s) by mouth 2 times a day MDD:400mg  -- Caution federal law prohibits the transfer of this drug to any person other  than the person for whom it was prescribed.  It is very important that you take or use this exactly as directed.  Do not skip doses or discontinue unless directed by your doctor.  May cause drowsiness or dizziness.  Obtain medical advice before taking any non-prescription drugs as some may affect the action of this medication.  This drug may impair the ability to drive or operate machinery.  Use care until you become familiar with its effects.    -- Indication: For Seizure    LaMICtal 150 mg oral tablet  -- 1 tab(s) by mouth 2 times a day   -- Avoid prolonged or excessive exposure to direct and/or artificial sunlight while taking this medication.  It is very important that you take or use this exactly as directed.  Do not skip doses or discontinue unless directed by your doctor.  May cause drowsiness.  Alcohol may intensify this effect.  Use care when operating dangerous machinery.    -- Indication: For Seizure    CeleXA 40 mg oral tablet  -- 1 tab(s) by mouth once a day  -- Indication: For Depression    Desyrel 50 mg oral tablet  -- 1 tab(s) by mouth once a day (at bedtime), As Needed for insomnia   -- Indication: For Depression    Vistaril 25 mg oral capsule  -- 1 cap(s) by mouth every 8 hours, As Needed  -- Indication: For Anxiety    Dulcolax Laxative 5 mg oral delayed release tablet  -- 1 tab(s) by mouth once a day, As Needed for constipation  -- Indication: For Constipation    Nicorette 2 mg oral transmucosal gum  -- 2 gum oral transmucosal every 2 hours, As Needed  -- Indication: For Smoking    Nicoderm 7 mg/24 hr transdermal film, extended release  -- 1 patch by transdermal patch once a day  -- Indication: For Smoking    Multiple Vitamins oral capsule  -- 1 cap(s) by mouth once a day  -- Indication: For Supplement    folic acid 1 mg oral tablet  -- 1 tab(s) by mouth once a day  -- Indication: For Supplement    thiamine 100 mg oral tablet  -- 1 tab(s) by mouth once a day  -- Indication: For Supplement

## 2018-08-20 NOTE — BEHAVIORAL HEALTH ASSESSMENT NOTE - CASE SUMMARY
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  Pt was visiting family in [place] (lives in [place] with  and children), binge drinking heavily, also abusing Xanax and prescription opiates, family intervened and pt was briefly admitted to [facility] for detox, relapse after d/c, and taken by family to [facility] for detox.  After detox was completed, pt was admitted to inpatient rehab, completed almost the entire program save for last 3-4 days when she started having seizures and was transferred to [facility] and now [facility] for continues vEEG monitoring.  Pt currently calm, cooperative, forthcoming, states she is considering her different options for substance abuse tx.  Endorses some depressive sx related to marital and family stressors but denies SIIP/HIIP, has been tending to household chores and ADLs.  No current tremors or e/o substance intox or withdrawal.  Denies AVH, paranoia.  Does not meet criteria for psychiatric hospitalization.  Dx: Polysubstance abuse, depressive disorder.  Recs: 1. C/w home dose Celexa.  2. PRN: Atarax 25mg PO q8h PRN anxiety.  3. May f/u as outpatient at [facility]- [phone], f/u with  for substance abuse referral resources in pt's preferred area

## 2018-08-20 NOTE — DISCHARGE NOTE ADULT - PROVIDER TOKENS
MERY:'[id number]' TOKEN:'[id number]',FREE:[LAST:[[facility]],PHONE:[phone]],FAX:[(   )    -]]

## 2018-08-20 NOTE — DISCHARGE NOTE ADULT - NS AS DC STROKE ED MATERIALS
Need for Followup After Discharge/Stroke Education Booklet/Prescribed Medications/Stroke Warning Signs and Symptoms/Risk Factors for Stroke/Call 911 for Stroke

## 2018-08-20 NOTE — BEHAVIORAL HEALTH ASSESSMENT NOTE - NSBHCHARTREVIEWIMAGING_PSY_A_CORE FT
EXAM:  CT BRAIN                            PROCEDURE DATE:  08/14/2018          INTERPRETATION:  .    CLINICAL INFORMATION: Seizure. Hit head.    TECHNIQUE: Multiple axial CT images of the head were obtained without   contrast. Sagittal and coronalreconstructed images were acquired from   the source data.    COMPARISON: No prior CT studies of the brain are available for comparison   at this institution.    FINDINGS: There is no acute intracranial hemorrhage, mass effect, shift   of the midlinestructures, herniation, extra-axial fluid collection, or   hydrocephalus.    The paranasal sinuses and mastoid air cells are clear. The orbits are   unremarkable. The calvarium is intact. The posterior arch of C1 remains   unfused.    IMPRESSION: No acute intracranial hemorrhage, mass effect, or shift of   the midline structures.        EUGENE SEWELL M.D., ATTENDING RADIOLOGIST  This document has been electronically signed. Aug 14 2018  4:24PM

## 2018-08-20 NOTE — DISCHARGE NOTE ADULT - MEDICATION SUMMARY - MEDICATIONS TO CHANGE
I will SWITCH the dose or number of times a day I take the medications listed below when I get home from the hospital:    lamoTRIgine 25 mg oral tablet  -- 4 tab(s) by mouth 2 times a day

## 2018-09-24 PROBLEM — Z00.00 ENCOUNTER FOR PREVENTIVE HEALTH EXAMINATION: Status: ACTIVE | Noted: 2018-09-24

## 2018-09-28 ENCOUNTER — APPOINTMENT (OUTPATIENT)
Dept: NEUROLOGY | Facility: CLINIC | Age: 43
End: 2018-09-28

## 2019-06-28 NOTE — BEHAVIORAL HEALTH ASSESSMENT NOTE - RISK ASSESSMENT
(3) adequate
Risk factors: h/o substance abuse     Protective factors: no current SIIP/HIIP, no h/o SA/SIB, no h/o psych admissions, no access to weapons, no active substance abuse, good physical health, no psychosis, engaged in work or school, dependent children, domiciled, intact marriage, social supports, positive therapeutic relationship, compliant with treatment, help-seeking behaviors    Overall, pt is a low risk of harm to self/others and does not require psychiatric admission for safety and stabilization.

## 2020-09-10 NOTE — CHART NOTE - NSCHARTNOTESELECT_GEN_ALL_CORE
Recommendations     1.) Advance diet as tolerated to cardiac, diabetic as tolerated once medically appropriate.     Goals: 1.) Pt to meet >75% of estimated energy and protein needs over the course of 7 days.  Nutrition Goal Status: new  Communication of RD Recs: reviewed with RN(POC)   PGY1/Rapid Response

## 2021-02-16 NOTE — BEHAVIORAL HEALTH ASSESSMENT NOTE - NSBHSUICRISKFACTOR_PSY_A_CORE
----- Message from Brendan Ramirez MD sent at 2/16/2021 12:23 PM CST -----  Please let him know that I saw the results of the blood work that was recently ordered.  His cholesterol is excellent.  Liver function and kidney function are stable.  He should talk to Dr. Naylor about the rest of the blood work that is not heart related.  Otherwise no need to make any changes from the heart standpoint.  ----- Message -----  From: Lab, Background User  Sent: 2/1/2021   8:33 PM CST  To: Brendan Ramirez MD      
Spoke to patient and results per MD given. Pt verbalized an understanding.     
Impulsivity/Substance abuse/dependence/Chronic pain or acute medical issue/Mood episode

## 2021-12-26 NOTE — H&P ADULT - SKIN
Patient reports bright red and dark red blood from rectum beginning yesterday. Passing clots today. Patient reports taking blood thinners.   detailed exam warm and dry/color normal

## 2023-03-02 NOTE — PATIENT PROFILE ADULT. - MEDICATION HERBAL REMEDIES, PROFILE
Spoke to pt and informed that the CT showed a small adenoma  which is cause of elevated calcium. Informed will discuss management at next visit. Appt scheduled for 03/03/23 at 9am   no

## 2023-06-27 NOTE — BEHAVIORAL HEALTH ASSESSMENT NOTE - NS ED [facility] MED ROS NEUROLOGICAL
What Type Of Note Output Would You Prefer (Optional)?: Standard Output How Severe Is Your Acne?: moderate Is This A New Presentation, Or A Follow-Up?: Acne No complaints

## 2023-10-30 NOTE — PATIENT PROFILE ADULT. - SW.
I concur with RN documentation and advised recommendations.       Also verify with fernanda John for patient to hold aspirin 81 mg for 7 days pre op.    social work

## 2023-12-18 NOTE — ED PROVIDER NOTE - CROS ED NEURO POS
Medication: carvedilol   Medication refill denied due to pt being seen by other cardiologist      SEIZURES/HEADACHE

## 2024-05-23 NOTE — H&P ADULT - NSHPSOCIALHISTORY_GEN_ALL_CORE
Marital Status:  Lives with:   Occupation:     Tobacco Use:   Alcohol Use:  Substance Use: Yes Marital Status:  with 2 kids  Lives with: Family   Occupation:     Tobacco Use: 1/2 ppd for 15yrs  Alcohol Use: 2 pints of vodka or 4/7 beers daily for 7 yrs  Substance Use: opiods, benzos and marijuana Marital Status:  with 2 kids  Lives with: Family     Tobacco Use: 1/2 ppd for 15yrs  Alcohol Use: 2 pints of vodka or 4/7 beers daily for 7 yrs  Substance Use: opiods, benzos and marijuana

## 2024-08-13 NOTE — BEHAVIORAL HEALTH ASSESSMENT NOTE - NSBHCHARTREVIEWINVESTIGATE_PSY_A_CORE FT
stated Ventricular Rate 54 BPM    Atrial Rate 54 BPM    P-R Interval 150 ms    QRS Duration 70 ms    Q-T Interval 446 ms    QTC Calculation(Bezet) 422 ms    P Axis 54 degrees    R Axis 57 degrees    T Axis 67 degrees    Diagnosis Line Sinus bradycardia with sinus arrhythmia  Low voltage QRS      Confirmed by Sheridan PEREZ, Fermín ([id number]) on 8/15/2018 8:33:49 AM